# Patient Record
Sex: MALE | Race: WHITE | NOT HISPANIC OR LATINO | Employment: OTHER | URBAN - METROPOLITAN AREA
[De-identification: names, ages, dates, MRNs, and addresses within clinical notes are randomized per-mention and may not be internally consistent; named-entity substitution may affect disease eponyms.]

---

## 2022-06-23 ENCOUNTER — TELEPHONE (OUTPATIENT)
Dept: ORTHOPEDICS | Facility: CLINIC | Age: 72
End: 2022-06-23
Payer: OTHER GOVERNMENT

## 2022-06-23 NOTE — TELEPHONE ENCOUNTER
"Returned call. [facility]. I told the patient I would call to scheduled as soon as I got the referral. Patient was agreeable.    ----- Message from Laurence Lazar sent at 6/23/2022  3:50 PM CDT -----  "Type:  Patient Call Back    Who Called:BROOKLYN ALFONSO [[id number]]    What is the reqeust in detail:Pt requesting call back to confirm if [facility] was [facility]. Please advise     Can the clinic reply by [facility]?no    Best Call Back Number:[phone]      Additional Information:Pt would like to schedule with Dr Perrin              "

## 2022-07-13 ENCOUNTER — TELEPHONE (OUTPATIENT)
Dept: ORTHOPEDICS | Facility: CLINIC | Age: 72
End: 2022-07-13
Payer: OTHER GOVERNMENT

## 2022-07-13 NOTE — TELEPHONE ENCOUNTER
Returned call. I told the patient I do not know of anyone with the name of Martin. Patient stated understanding.      ----- Message from Nakia Cruz sent at 7/13/2022  2:34 PM CDT -----  Name Of Caller: Bubba     Provider Name: Ludwin Palacio,     Does patient feel the need to be seen today? No     Relationship to the Pt?: pt     Contact Preference?: [phone]    What is the nature of the call?:Pt called and someone name Martin called him and left a message to say call back wanted to see if it was your office

## 2022-08-16 DIAGNOSIS — M25.562 LEFT KNEE PAIN, UNSPECIFIED CHRONICITY: Primary | ICD-10-CM

## 2022-08-24 ENCOUNTER — PATIENT MESSAGE (OUTPATIENT)
Dept: SURGERY | Facility: HOSPITAL | Age: 72
End: 2022-08-24
Payer: OTHER GOVERNMENT

## 2022-08-24 ENCOUNTER — HOSPITAL ENCOUNTER (OUTPATIENT)
Dept: RADIOLOGY | Facility: HOSPITAL | Age: 72
Discharge: HOME | End: 2022-08-24
Attending: ORTHOPAEDIC SURGERY
Payer: OTHER GOVERNMENT

## 2022-08-24 ENCOUNTER — OFFICE VISIT (OUTPATIENT)
Dept: ORTHOPEDICS | Facility: CLINIC | Age: 72
End: 2022-08-24
Payer: OTHER GOVERNMENT

## 2022-08-24 VITALS — RESPIRATION RATE: 14 BRPM | WEIGHT: 248 LBS | BODY MASS INDEX: 32.87 KG/M2 | HEIGHT: 73 IN

## 2022-08-24 DIAGNOSIS — M25.562 CHRONIC PAIN OF LEFT KNEE: ICD-10-CM

## 2022-08-24 DIAGNOSIS — S83.512A CHRONIC RUPTURE OF ACL OF LEFT KNEE: ICD-10-CM

## 2022-08-24 DIAGNOSIS — G89.29 CHRONIC PAIN OF LEFT KNEE: ICD-10-CM

## 2022-08-24 DIAGNOSIS — M17.12 PRIMARY OSTEOARTHRITIS OF LEFT KNEE: Primary | ICD-10-CM

## 2022-08-24 DIAGNOSIS — Z01.818 PREOP TESTING: ICD-10-CM

## 2022-08-24 DIAGNOSIS — M71.22 BAKER CYST, LEFT: ICD-10-CM

## 2022-08-24 DIAGNOSIS — M25.562 LEFT KNEE PAIN, UNSPECIFIED CHRONICITY: ICD-10-CM

## 2022-08-24 PROCEDURE — 99204 OFFICE O/P NEW MOD 45 MIN: CPT | Mod: 57,S$PBB,, | Performed by: ORTHOPAEDIC SURGERY

## 2022-08-24 PROCEDURE — 73562 X-RAY EXAM OF KNEE 3: CPT | Mod: TC,LT

## 2022-08-24 PROCEDURE — 73562 X-RAY EXAM OF KNEE 3: CPT | Mod: 26,LT,, | Performed by: RADIOLOGY

## 2022-08-24 PROCEDURE — 73562 XR KNEE 3 VIEW LEFT: ICD-10-PCS | Mod: 26,LT,, | Performed by: RADIOLOGY

## 2022-08-24 PROCEDURE — 99213 OFFICE O/P EST LOW 20 MIN: CPT | Mod: PBBFAC | Performed by: ORTHOPAEDIC SURGERY

## 2022-08-24 PROCEDURE — 99999 PR PBB SHADOW E&M-EST. PATIENT-LVL III: ICD-10-PCS | Mod: PBBFAC,,, | Performed by: ORTHOPAEDIC SURGERY

## 2022-08-24 PROCEDURE — 99204 PR OFFICE/OUTPT VISIT, NEW, LEVL IV, 45-59 MIN: ICD-10-PCS | Mod: 57,S$PBB,, | Performed by: ORTHOPAEDIC SURGERY

## 2022-08-24 PROCEDURE — 99999 PR PBB SHADOW E&M-EST. PATIENT-LVL III: CPT | Mod: PBBFAC,,, | Performed by: ORTHOPAEDIC SURGERY

## 2022-08-24 RX ORDER — SILDENAFIL 100 MG/1
100 TABLET, FILM COATED ORAL
COMMUNITY
Start: 2022-05-17

## 2022-08-24 RX ORDER — METFORMIN HYDROCHLORIDE 1000 MG/1
500 TABLET ORAL
COMMUNITY
Start: 2022-04-07

## 2022-08-24 RX ORDER — METOPROLOL SUCCINATE 25 MG/1
25 TABLET, EXTENDED RELEASE ORAL DAILY
Status: ON HOLD | COMMUNITY
End: 2023-07-31 | Stop reason: SDUPTHER
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
## 2022-09-08 ENCOUNTER — DOCUMENTATION ONLY (OUTPATIENT)
Dept: ORTHOPEDICS | Facility: CLINIC | Age: 72
End: 2022-09-08
Payer: OTHER GOVERNMENT

## 2022-09-08 NOTE — PROGRESS NOTES
[facility] pharmacy alternative request form was reviewed, filled out, and signed by provider. Form was successfully faxed to [phone].    Batched to medical records.

## 2022-09-09 ENCOUNTER — TELEPHONE (OUTPATIENT)
Dept: ORTHOPEDICS | Facility: CLINIC | Age: 72
End: 2022-09-09
Payer: OTHER GOVERNMENT

## 2022-09-09 ENCOUNTER — PATIENT MESSAGE (OUTPATIENT)
Dept: SURGERY | Facility: HOSPITAL | Age: 72
End: 2022-09-09
Payer: OTHER GOVERNMENT

## 2022-09-09 ENCOUNTER — ANESTHESIA EVENT (OUTPATIENT)
Dept: SURGERY | Facility: HOSPITAL | Age: 72
DRG: 983 | End: 2022-09-09
Payer: OTHER GOVERNMENT

## 2022-09-09 ENCOUNTER — HOSPITAL ENCOUNTER (OUTPATIENT)
Dept: PREADMISSION TESTING | Facility: HOSPITAL | Age: 72
Discharge: HOME | End: 2022-09-09
Attending: ORTHOPAEDIC SURGERY
Payer: OTHER GOVERNMENT

## 2022-09-09 DIAGNOSIS — G89.29 CHRONIC PAIN OF LEFT KNEE: ICD-10-CM

## 2022-09-09 DIAGNOSIS — M17.10 KNEE ARTHROPATHY: Primary | ICD-10-CM

## 2022-09-09 DIAGNOSIS — M17.12 PRIMARY OSTEOARTHRITIS OF LEFT KNEE: Primary | ICD-10-CM

## 2022-09-09 DIAGNOSIS — M25.562 CHRONIC PAIN OF LEFT KNEE: ICD-10-CM

## 2022-09-09 DIAGNOSIS — Z01.818 PRE-OP TESTING: ICD-10-CM

## 2022-09-09 DIAGNOSIS — M71.22 BAKER CYST, LEFT: ICD-10-CM

## 2022-09-09 DIAGNOSIS — S83.512A CHRONIC RUPTURE OF ACL OF LEFT KNEE: ICD-10-CM

## 2022-09-09 PROCEDURE — 99900103 DSU ONLY-NO CHARGE-INITIAL HR (STAT)

## 2022-09-09 NOTE — PRE ADMISSION SCREENING
JOINT CAMP ASSESSMENT    Name Bubba Gtz   MRN [id number]    Age/Sex 72 y.o. male    Surgeon Dr. Ludwin Palacio   Joint Camp Date 9/9/2022   Surgery Date 9/19/2022   Procedure Left Knee Arthroplasty   Insurance Payor: [employer] AD[employer]ISTRATION / Plan: [facility] OPTUM / Product Type: Government /    Care Team Patient Care Team:  Primary Doctor No as PCP - General    Pharmacy   [place] [facility] - [place] - [address]  [address]  [place] [address]  Phone: [phone] Fax: [phone]    [id number] - [place], [place] - [address]  [address] [place] [address]  Phone: [phone] Fax: [phone]     AM-PAC Score   24   Risk Assessment Score 2     Past Medical History:   Diagnosis Date    Diabetes mellitus, type 2     Hypertension        Past Surgical History:   Procedure Laterality Date    JOINT REPLACEMENT  2013    Knee    KNEE ARTHROSCOPY           Home Enviroment     Living Arrangement: Lives with spouse  Home Environment: 2-story house, number of outside stairs: 14, number of inside stairs: 13, can live on one level, tub-shower  Home Safety Concerns: unremarkable    DISCHARGE CAREGIVER/SUPPORT SYSTEM     Identified post-op caregiver: Patient has spouse / significant other.  Patient's caregiver(s) will be able to provide physical assistance. Patient will have someone to assist overnight.      Caregiver present at pre-op interview:  Yes      PRE-OPERATIVE FUNCTIONAL STATUS     Employment: Retired    Pre-op Functional Status: Patient is independent with mobility/ambulation, transfers, ADL's, IADL's.    Use of assistive device for ambulation: none  ADL: self care  ADL Limitations: difficulty with walking  Medical Restrictions: Unstable ambulation and Decreased range of motions in extremities    POTENTIAL BARRIERS TO DISCHARGE/POTENTIAL POST-OP COMPLICATIONS     Patient with hx of diabetes mellitus, HTN.    DISCHARGE PLAN     Expected LOS of 1 days or less for joint  replacement discussed with patient. We also discussed a discharge path of HH for approximately two weeks with a transition to outpatient PT on the third week given no post-op complications.      Patient in agreement with discharge plan: Yes    Discharge to: Discharge home with home health (PT/OT) x2 weeks with transition to outpatient PT     HH:  [facility].  Patient disclosure form completed and sent to case management for upload to the medical record.      OP PT: [facility] ([place], [place])     Home DME: rolling walker and single point cane    Needed DME at D/C: none Patient refused bedside commode and tub transfer bench.     Rx: Per Dr. Palacio at discharge     Meds to Beds: Yes  Patient expected to discharge on Xarelto (Rivaroxaban) for DVT prophylaxis.

## 2022-09-09 NOTE — PRE ADMISSION SCREENING
"               [facility] Risk Assessment Scale    Patient Name: Bubba Gtz  YOB: 1950  MRN: [id number]            RIsk Factor Measure Recommendation Patient Data Scale/Score   BMI >40 Reconsider surgery, weight loss   Estimated body mass index is 32.72 kg/m² as calculated from the following:    Height as of 8/24/22: 6' 1" (1.854 m).    Weight as of 8/24/22: 112.5 kg (248 lb).   [] 0 = 1 - 24.9  [] 1 = 25-29.9  [x] 2 = 30-34.9  [] 3 = 35-39.9  [] 4 = 40-44.9  [] 5 = 45-99.9   Hemoglobin AIC (if applicable) >9 Delay surgery until DM under control  Refer for:  Nutrition Therapy  Exercise   Medication    No results found for: LABA1C, HGBA1C    Lab Results   Component Value Date     (H) 09/09/2022      [] 0 = 4.0-5.6  [] 1 = 5.7-6.4  [] 2 = 6.5-6.9  [] 3 = 7.0-7.9  [] 4 = 8.0-8.9  [] 5 = 9.0-12   Hemoglobin (Anemia) <9 Delay surgery   Correct anemia Lab Results   Component Value Date    HGB 14.1 09/09/2022    [] 20 - <9.0                    Albumin <3 Delay surgery &Workup No results found for: ALBUMIN [] 20 - <3.0   Smoking Cessation >4 Weeks Delay Surgery  Refer to OP Cessation Class    Never Smoker [] 20 - current smoker                                _____ PPD                    Hx of MI, PE, Arrhythmia, CVA, DVT <30 Days Delay Surgery    N/A [] 20      Infection Variable Delay surgery and re-evaluate   N/A [] 20 - recent/current infection     Depression (PHQ) >10 out of 27 Delay Surgery and re-evaluate  Medication  Counseling              [x] 0     []1     []2     []3      []4      [] 5                    (1-4)      (5-9)  (10-14)  (15-19)   (20-27)     Memory Impairment & Memory loss (Mini-Cog Screening Tool) Advanced dementia and/or Parkinson's Reconsider surgery     [x] 0     []1     []2     []3     []4     [] 5     Physical Conditioning (Modified AM-PAC Per Physical Therapy at [facility]) Unable to ambulate on day of surgery Delay surgery and re-evaluate  Pre-Rehabilitation   (PT " evaluation)       [x]  0   []4       []8     []12        []16     []20       (<20%)   (<40%)   (<60%)   (<80% )    (>80%)     Home Environment/Caregiver support  (Per /Navigator Interview)    Availability of basic services and/or approprate assistance during post-operative period Delay surgery and re-evaluate  Safe home environment  Health   1 week post-surgery  Transportation  availability  Ability to obtain DME/Medications post-op    [x] 0     []1     []2     []3     []4     [] 5  [x] 0     []1     []2     []3     []4     [] 5  [x] 0     []1     []2     []3     []4     [] 5  [x] 0     []1     []2     []3     []4     [] 5         MD Contact: Dr. Palacio Comments:  Total Score:  2

## 2022-09-09 NOTE — ANESTHESIA PREPROCEDURE EVALUATION
09/09/2022  Bubba Gtz is a 72 y.o., male.      Pre-op Assessment    I have reviewed the Patient Summary Reports.     I have reviewed the Nursing Notes. I have reviewed the NPO Status.   I have reviewed the Medications.     Review of Systems  Social:  Non-Smoker    Hematology/Oncology:  Hematology Normal   Oncology Normal     EENT/Dental:EENT/Dental Normal   Cardiovascular:   Hypertension Dysrhythmias atrial fibrillation    Renal/:  Renal/ Normal     Hepatic/GI:  Hepatic/GI Normal    Neurological:   Peripheral Neuropathy    Endocrine:   Diabetes    Dermatological:  Skin Normal    Psych:  Psychiatric Normal           Physical Exam    Airway:  Mallampati: II   Mouth Opening: Normal  TM Distance: Normal  Tongue: Normal  Neck ROM: Normal ROM  Neck: Girth Increased    Chest/Lungs:  Clear to auscultation    Heart:  Rate: Normal  Rhythm: Irregularly Irregular        Anesthesia Plan  Type of Anesthesia, risks & benefits discussed:    Anesthesia Type: Gen ETT  Intra-op Monitoring Plan: Standard ASA Monitors  Post Op Pain Control Plan: multimodal analgesia and peripheral nerve block  Induction:  IV  Airway Plan: Direct and Video  Informed Consent: Informed consent signed with the Patient and all parties understand the risks and agree with anesthesia plan.  All questions answered.   ASA Score: 3  Day of Surgery Review of History & Physical: H&P Update referred to the surgeon/provider.    Ready For Surgery From Anesthesia Perspective.     .

## 2022-09-09 NOTE — TELEPHONE ENCOUNTER
I contacted the patient. I stated that his potassium and glucose are elevated. I stated he needed to contact his primary care provider. Patient stated he was going to contact his primary care provider.      ----- Message from Ludwin Palacio DO sent at 9/9/2022  3:47 PM CDT -----  Could you please contact the patient in notify me he has an elevated potassium and glucose and have him contact his PCM for evaluation and treatment

## 2022-09-09 NOTE — PRE ADMISSION SCREENING
Patient Name: Bubba Gtz  YOB: 1950   MRN: [id number]     [facility]   Basic Mobility Inpatient Short Form 6 Clicks         How much difficulty does the patient currently have  Unable  A Lot  A Little  None      1. Turning over in bed (including adjusting bedclothes, sheets and blankets)?     1 []    2 []    3 []    4 [x]        2. Sitting down on and standing up from a chair with arms (e.g., wheelchair, bedside commode, etc.)     1 []  2 []  3 []     4 [x]      3. Moving from lying on back to sitting on the side of the bed?     1 []  2 []  3 []    4 [x]    How much help from another person does the patient currently need  Total  A Lot  A Little  None      4. Moving to and from a bed to a chair (including a wheelchair)?    1 []  2 []  3 []    4 [x]      5. Need to walk in hospital room?    1 []  2 []  3 []    4 [x]      6. Climbing 3-5 steps with a railing?    1 []  2 []  3 []    4 [x]       Raw Score:      24            CMS 0-100% Score:     0       %   Standardized Score:     61.14          CMS Modifier:       [facility] AM-PAC   Basic Mobility Inpatient Short Form 6 Clicks Score Conversion Table*         *Use this form to convert -PAC Basic Mobility Inpatient Raw Scores.   [facility] Inpatient Basic Mobility Short Form Scoring Example   1. Add the number values associated with the response to each item. For example, items totals yield a Raw Score of 21.   2. Match the raw score to the t-Scale scores (t-Scale score = 50.25, SE = 4.69).   3. Find the associated CMS % (CMS % = 28.97%).   4. Locate the correct CMS Functional Modifier Code, or G Code (G code = CJ)     NOTE: Each -PAC Short Form has a separate conversion table. Make sure that you use the correct conversion table.       Instruction Manual - page 45 contains conversion table

## 2022-09-19 ENCOUNTER — HOSPITAL ENCOUNTER (INPATIENT)
Facility: HOSPITAL | Age: 72
LOS: 1 days | Discharge: LEFT BEFORE DISCHARGE | DRG: 983 | End: 2022-09-20
Attending: ORTHOPAEDIC SURGERY | Admitting: STUDENT IN AN ORGANIZED HEALTH CARE EDUCATION/TRAINING PROGRAM
Payer: OTHER GOVERNMENT

## 2022-09-19 ENCOUNTER — ANESTHESIA (OUTPATIENT)
Dept: SURGERY | Facility: HOSPITAL | Age: 72
DRG: 983 | End: 2022-09-19
Payer: OTHER GOVERNMENT

## 2022-09-19 DIAGNOSIS — R07.9 CHEST PAIN: ICD-10-CM

## 2022-09-19 DIAGNOSIS — Z01.818 PRE-OP TESTING: ICD-10-CM

## 2022-09-19 DIAGNOSIS — M17.12 PRIMARY OSTEOARTHRITIS OF LEFT KNEE: Primary | ICD-10-CM

## 2022-09-19 DIAGNOSIS — Z01.818 PRE-OP EXAM: ICD-10-CM

## 2022-09-19 PROBLEM — I48.91 ATRIAL FIBRILLATION WITH RVR: Status: ACTIVE | Noted: 2022-09-19

## 2022-09-19 PROBLEM — E11.9 NON-INSULIN DEPENDENT TYPE 2 DIABETES MELLITUS: Status: ACTIVE | Noted: 2022-09-19

## 2022-09-19 PROBLEM — Z96.652 STATUS POST TOTAL LEFT KNEE REPLACEMENT: Status: ACTIVE | Noted: 2022-09-19

## 2022-09-19 LAB
ALBUMIN SERPL BCP-MCNC: 3.8 G/DL (ref 3.5–5.2)
ALP SERPL-CCNC: 53 U/L (ref 55–135)
ALT SERPL W/O P-5'-P-CCNC: 49 U/L (ref 10–44)
ANION GAP SERPL CALC-SCNC: 13 MMOL/L (ref 8–16)
AST SERPL-CCNC: 34 U/L (ref 10–40)
BASOPHILS # BLD AUTO: 0.05 K/UL (ref 0–0.2)
BASOPHILS NFR BLD: 0.4 % (ref 0–1.9)
BILIRUB SERPL-MCNC: 0.8 MG/DL (ref 0.1–1)
BUN SERPL-MCNC: 16 MG/DL (ref 8–23)
CALCIUM SERPL-MCNC: 8.6 MG/DL (ref 8.7–10.5)
CHLORIDE SERPL-SCNC: 103 MMOL/L (ref 95–110)
CO2 SERPL-SCNC: 21 MMOL/L (ref 23–29)
CREAT SERPL-MCNC: 1.2 MG/DL (ref 0.5–1.4)
DIFFERENTIAL METHOD: ABNORMAL
EOSINOPHIL # BLD AUTO: 0 K/UL (ref 0–0.5)
EOSINOPHIL NFR BLD: 0.1 % (ref 0–8)
ERYTHROCYTE [DISTWIDTH] IN BLOOD BY AUTOMATED COUNT: 12.6 % (ref 11.5–14.5)
EST. GFR  (NO RACE VARIABLE): >60 ML/MIN/1.73 M^2
GLUCOSE SERPL-MCNC: 293 MG/DL (ref 70–110)
HCT VFR BLD AUTO: 38.2 % (ref 40–54)
HGB BLD-MCNC: 13 G/DL (ref 14–18)
IMM GRANULOCYTES # BLD AUTO: 0.07 K/UL (ref 0–0.04)
IMM GRANULOCYTES NFR BLD AUTO: 0.5 % (ref 0–0.5)
LYMPHOCYTES # BLD AUTO: 0.9 K/UL (ref 1–4.8)
LYMPHOCYTES NFR BLD: 6.6 % (ref 18–48)
MAGNESIUM SERPL-MCNC: 1.8 MG/DL (ref 1.6–2.6)
MCH RBC QN AUTO: 31.9 PG (ref 27–31)
MCHC RBC AUTO-ENTMCNC: 34 G/DL (ref 32–36)
MCV RBC AUTO: 94 FL (ref 82–98)
MONOCYTES # BLD AUTO: 0.2 K/UL (ref 0.3–1)
MONOCYTES NFR BLD: 1.2 % (ref 4–15)
NEUTROPHILS # BLD AUTO: 12.2 K/UL (ref 1.8–7.7)
NEUTROPHILS NFR BLD: 91.2 % (ref 38–73)
NRBC BLD-RTO: 0 /100 WBC
PLATELET # BLD AUTO: 195 K/UL (ref 150–450)
PMV BLD AUTO: 9.8 FL (ref 9.2–12.9)
POCT GLUCOSE: 197 MG/DL (ref 70–110)
POCT GLUCOSE: 202 MG/DL (ref 70–110)
POCT GLUCOSE: 269 MG/DL (ref 70–110)
POTASSIUM SERPL-SCNC: 5.2 MMOL/L (ref 3.5–5.1)
PROT SERPL-MCNC: 7.4 G/DL (ref 6–8.4)
RBC # BLD AUTO: 4.08 M/UL (ref 4.6–6.2)
SODIUM SERPL-SCNC: 137 MMOL/L (ref 136–145)
WBC # BLD AUTO: 13.39 K/UL (ref 3.9–12.7)

## 2022-09-19 PROCEDURE — 36415 COLL VENOUS BLD VENIPUNCTURE: CPT | Performed by: STUDENT IN AN ORGANIZED HEALTH CARE EDUCATION/TRAINING PROGRAM

## 2022-09-19 PROCEDURE — 37000008 HC ANESTHESIA 1ST 15 MINUTES: Performed by: ORTHOPAEDIC SURGERY

## 2022-09-19 PROCEDURE — 25000003 PHARM REV CODE 250

## 2022-09-19 PROCEDURE — 36000710: Performed by: ORTHOPAEDIC SURGERY

## 2022-09-19 PROCEDURE — 25000003 PHARM REV CODE 250: Performed by: STUDENT IN AN ORGANIZED HEALTH CARE EDUCATION/TRAINING PROGRAM

## 2022-09-19 PROCEDURE — 93010 ELECTROCARDIOGRAM REPORT: CPT | Mod: ,,, | Performed by: INTERNAL MEDICINE

## 2022-09-19 PROCEDURE — 85025 COMPLETE CBC W/AUTO DIFF WBC: CPT | Performed by: STUDENT IN AN ORGANIZED HEALTH CARE EDUCATION/TRAINING PROGRAM

## 2022-09-19 PROCEDURE — D9220A PRA ANESTHESIA: Mod: ANES,,, | Performed by: ANESTHESIOLOGY

## 2022-09-19 PROCEDURE — 27201423 OPTIME MED/SURG SUP & DEVICES STERILE SUPPLY: Performed by: ORTHOPAEDIC SURGERY

## 2022-09-19 PROCEDURE — 64447 NJX AA&/STRD FEMORAL NRV IMG: CPT | Performed by: ANESTHESIOLOGY

## 2022-09-19 PROCEDURE — 97162 PT EVAL MOD COMPLEX 30 MIN: CPT

## 2022-09-19 PROCEDURE — 93010 EKG 12-LEAD: ICD-10-PCS | Mod: ,,, | Performed by: INTERNAL MEDICINE

## 2022-09-19 PROCEDURE — 71000033 HC RECOVERY, INTIAL HOUR: Performed by: ORTHOPAEDIC SURGERY

## 2022-09-19 PROCEDURE — 27447 PR TOTAL KNEE ARTHROPLASTY: ICD-10-PCS | Mod: LT,,, | Performed by: ORTHOPAEDIC SURGERY

## 2022-09-19 PROCEDURE — C1889 IMPLANT/INSERT DEVICE, NOC: HCPCS | Performed by: ORTHOPAEDIC SURGERY

## 2022-09-19 PROCEDURE — 37000009 HC ANESTHESIA EA ADD 15 MINS: Performed by: ORTHOPAEDIC SURGERY

## 2022-09-19 PROCEDURE — 80053 COMPREHEN METABOLIC PANEL: CPT | Performed by: STUDENT IN AN ORGANIZED HEALTH CARE EDUCATION/TRAINING PROGRAM

## 2022-09-19 PROCEDURE — 94761 N-INVAS EAR/PLS OXIMETRY MLT: CPT

## 2022-09-19 PROCEDURE — 63600175 PHARM REV CODE 636 W HCPCS: Performed by: ORTHOPAEDIC SURGERY

## 2022-09-19 PROCEDURE — 97116 GAIT TRAINING THERAPY: CPT

## 2022-09-19 PROCEDURE — 93005 ELECTROCARDIOGRAM TRACING: CPT

## 2022-09-19 PROCEDURE — C1776 JOINT DEVICE (IMPLANTABLE): HCPCS | Performed by: ORTHOPAEDIC SURGERY

## 2022-09-19 PROCEDURE — 25000003 PHARM REV CODE 250: Performed by: ANESTHESIOLOGY

## 2022-09-19 PROCEDURE — 64447 PR NERVE BLOCK INJ, ANES/STEROID, FEMORAL, INCL IMAG GUIDANCE: ICD-10-PCS | Mod: 59,LT,, | Performed by: ANESTHESIOLOGY

## 2022-09-19 PROCEDURE — 36000711: Performed by: ORTHOPAEDIC SURGERY

## 2022-09-19 PROCEDURE — D9220A PRA ANESTHESIA: ICD-10-PCS | Mod: CRNA,,, | Performed by: NURSE ANESTHETIST, CERTIFIED REGISTERED

## 2022-09-19 PROCEDURE — 99291 PR CRITICAL CARE, E/M 30-74 MINUTES: ICD-10-PCS | Mod: ICN,,, | Performed by: STUDENT IN AN ORGANIZED HEALTH CARE EDUCATION/TRAINING PROGRAM

## 2022-09-19 PROCEDURE — 71000039 HC RECOVERY, EACH ADD'L HOUR: Performed by: ORTHOPAEDIC SURGERY

## 2022-09-19 PROCEDURE — 27447 TOTAL KNEE ARTHROPLASTY: CPT | Mod: LT,,, | Performed by: ORTHOPAEDIC SURGERY

## 2022-09-19 PROCEDURE — 25000003 PHARM REV CODE 250: Performed by: NURSE ANESTHETIST, CERTIFIED REGISTERED

## 2022-09-19 PROCEDURE — 83735 ASSAY OF MAGNESIUM: CPT | Performed by: STUDENT IN AN ORGANIZED HEALTH CARE EDUCATION/TRAINING PROGRAM

## 2022-09-19 PROCEDURE — 94799 UNLISTED PULMONARY SVC/PX: CPT

## 2022-09-19 PROCEDURE — C1713 ANCHOR/SCREW BN/BN,TIS/BN: HCPCS | Performed by: ORTHOPAEDIC SURGERY

## 2022-09-19 PROCEDURE — 11000001 HC ACUTE MED/SURG PRIVATE ROOM

## 2022-09-19 PROCEDURE — D9220A PRA ANESTHESIA: ICD-10-PCS | Mod: ANES,,, | Performed by: ANESTHESIOLOGY

## 2022-09-19 PROCEDURE — 63600175 PHARM REV CODE 636 W HCPCS: Performed by: NURSE ANESTHETIST, CERTIFIED REGISTERED

## 2022-09-19 PROCEDURE — 63600175 PHARM REV CODE 636 W HCPCS

## 2022-09-19 PROCEDURE — 64447 NJX AA&/STRD FEMORAL NRV IMG: CPT | Mod: 59,LT,, | Performed by: ANESTHESIOLOGY

## 2022-09-19 PROCEDURE — D9220A PRA ANESTHESIA: Mod: CRNA,,, | Performed by: NURSE ANESTHETIST, CERTIFIED REGISTERED

## 2022-09-19 PROCEDURE — 99291 CRITICAL CARE FIRST HOUR: CPT | Mod: ICN,,, | Performed by: STUDENT IN AN ORGANIZED HEALTH CARE EDUCATION/TRAINING PROGRAM

## 2022-09-19 PROCEDURE — 97110 THERAPEUTIC EXERCISES: CPT

## 2022-09-19 PROCEDURE — 63600175 PHARM REV CODE 636 W HCPCS: Performed by: STUDENT IN AN ORGANIZED HEALTH CARE EDUCATION/TRAINING PROGRAM

## 2022-09-19 PROCEDURE — 63600175 PHARM REV CODE 636 W HCPCS: Performed by: ANESTHESIOLOGY

## 2022-09-19 DEVICE — IMPLANTABLE DEVICE: Type: IMPLANTABLE DEVICE | Site: KNEE | Status: FUNCTIONAL

## 2022-09-19 DEVICE — CEMENT BONE SMPLX HV GENTMYCN: Type: IMPLANTABLE DEVICE | Site: KNEE | Status: FUNCTIONAL

## 2022-09-19 DEVICE — PATELLA OVAL 38MM: Type: IMPLANTABLE DEVICE | Site: KNEE | Status: FUNCTIONAL

## 2022-09-19 DEVICE — TRAY TIBIAL CEMENT SZ 5 COBALT: Type: IMPLANTABLE DEVICE | Site: KNEE | Status: FUNCTIONAL

## 2022-09-19 RX ORDER — CEFAZOLIN SODIUM 2 G/50ML
SOLUTION INTRAVENOUS
Status: COMPLETED
Start: 2022-09-19 | End: 2022-09-19

## 2022-09-19 RX ORDER — METOPROLOL TARTRATE 25 MG/1
12.5 TABLET ORAL 2 TIMES DAILY
Status: DISCONTINUED | OUTPATIENT
Start: 2022-09-19 | End: 2022-09-20

## 2022-09-19 RX ORDER — SUCCINYLCHOLINE CHLORIDE 20 MG/ML
INJECTION INTRAMUSCULAR; INTRAVENOUS
Status: DISCONTINUED | OUTPATIENT
Start: 2022-09-19 | End: 2022-09-19

## 2022-09-19 RX ORDER — CEFAZOLIN SODIUM 2 G/50ML
2 SOLUTION INTRAVENOUS
Status: DISCONTINUED | OUTPATIENT
Start: 2022-09-19 | End: 2022-09-20 | Stop reason: HOSPADM

## 2022-09-19 RX ORDER — PROPOFOL 10 MG/ML
VIAL (ML) INTRAVENOUS
Status: DISCONTINUED | OUTPATIENT
Start: 2022-09-19 | End: 2022-09-19

## 2022-09-19 RX ORDER — DIPHENHYDRAMINE HYDROCHLORIDE 50 MG/ML
12.5 INJECTION INTRAMUSCULAR; INTRAVENOUS
Status: DISCONTINUED | OUTPATIENT
Start: 2022-09-19 | End: 2022-09-19 | Stop reason: HOSPADM

## 2022-09-19 RX ORDER — NALOXONE HCL 0.4 MG/ML
0.02 VIAL (ML) INJECTION
Status: DISCONTINUED | OUTPATIENT
Start: 2022-09-19 | End: 2022-09-20 | Stop reason: HOSPADM

## 2022-09-19 RX ORDER — HYDROMORPHONE HYDROCHLORIDE 2 MG/ML
INJECTION, SOLUTION INTRAMUSCULAR; INTRAVENOUS; SUBCUTANEOUS
Status: DISCONTINUED | OUTPATIENT
Start: 2022-09-19 | End: 2022-09-19

## 2022-09-19 RX ORDER — MORPHINE SULFATE 4 MG/ML
6 INJECTION, SOLUTION INTRAMUSCULAR; INTRAVENOUS
Status: DISCONTINUED | OUTPATIENT
Start: 2022-09-19 | End: 2022-09-20 | Stop reason: HOSPADM

## 2022-09-19 RX ORDER — OXYCODONE AND ACETAMINOPHEN 10; 325 MG/1; MG/1
1 TABLET ORAL EVERY 4 HOURS PRN
Status: DISCONTINUED | OUTPATIENT
Start: 2022-09-19 | End: 2022-09-19

## 2022-09-19 RX ORDER — ROPIVACAINE HYDROCHLORIDE 5 MG/ML
INJECTION, SOLUTION EPIDURAL; INFILTRATION; PERINEURAL
Status: COMPLETED | OUTPATIENT
Start: 2022-09-19 | End: 2022-09-19

## 2022-09-19 RX ORDER — GLUCAGON 1 MG
1 KIT INJECTION
Status: DISCONTINUED | OUTPATIENT
Start: 2022-09-19 | End: 2022-09-20 | Stop reason: HOSPADM

## 2022-09-19 RX ORDER — CEFAZOLIN SODIUM 1 G/3ML
INJECTION, POWDER, FOR SOLUTION INTRAMUSCULAR; INTRAVENOUS
Status: DISCONTINUED | OUTPATIENT
Start: 2022-09-19 | End: 2022-09-19

## 2022-09-19 RX ORDER — ONDANSETRON 2 MG/ML
4 INJECTION INTRAMUSCULAR; INTRAVENOUS DAILY PRN
Status: DISCONTINUED | OUTPATIENT
Start: 2022-09-19 | End: 2022-09-19 | Stop reason: HOSPADM

## 2022-09-19 RX ORDER — MORPHINE SULFATE 4 MG/ML
2 INJECTION, SOLUTION INTRAMUSCULAR; INTRAVENOUS EVERY 4 HOURS PRN
Status: DISCONTINUED | OUTPATIENT
Start: 2022-09-19 | End: 2022-09-20 | Stop reason: HOSPADM

## 2022-09-19 RX ORDER — SODIUM CHLORIDE, SODIUM LACTATE, POTASSIUM CHLORIDE, CALCIUM CHLORIDE 600; 310; 30; 20 MG/100ML; MG/100ML; MG/100ML; MG/100ML
INJECTION, SOLUTION INTRAVENOUS CONTINUOUS PRN
Status: DISCONTINUED | OUTPATIENT
Start: 2022-09-19 | End: 2022-09-19

## 2022-09-19 RX ORDER — METOPROLOL TARTRATE 1 MG/ML
INJECTION, SOLUTION INTRAVENOUS
Status: COMPLETED
Start: 2022-09-19 | End: 2022-09-19

## 2022-09-19 RX ORDER — ROCURONIUM BROMIDE 10 MG/ML
INJECTION, SOLUTION INTRAVENOUS
Status: DISCONTINUED | OUTPATIENT
Start: 2022-09-19 | End: 2022-09-19

## 2022-09-19 RX ORDER — INSULIN ASPART 100 [IU]/ML
0-5 INJECTION, SOLUTION INTRAVENOUS; SUBCUTANEOUS
Status: DISCONTINUED | OUTPATIENT
Start: 2022-09-19 | End: 2022-09-20 | Stop reason: HOSPADM

## 2022-09-19 RX ORDER — MIDAZOLAM HYDROCHLORIDE 1 MG/ML
INJECTION INTRAMUSCULAR; INTRAVENOUS
Status: DISCONTINUED | OUTPATIENT
Start: 2022-09-19 | End: 2022-09-19

## 2022-09-19 RX ORDER — FENTANYL CITRATE 50 UG/ML
INJECTION, SOLUTION INTRAMUSCULAR; INTRAVENOUS
Status: DISCONTINUED | OUTPATIENT
Start: 2022-09-19 | End: 2022-09-19

## 2022-09-19 RX ORDER — SODIUM CHLORIDE, SODIUM LACTATE, POTASSIUM CHLORIDE, CALCIUM CHLORIDE 600; 310; 30; 20 MG/100ML; MG/100ML; MG/100ML; MG/100ML
125 INJECTION, SOLUTION INTRAVENOUS CONTINUOUS
Status: DISCONTINUED | OUTPATIENT
Start: 2022-09-19 | End: 2022-09-19

## 2022-09-19 RX ORDER — FAMOTIDINE 10 MG/ML
INJECTION INTRAVENOUS
Status: COMPLETED
Start: 2022-09-19 | End: 2022-09-19

## 2022-09-19 RX ORDER — METOPROLOL TARTRATE 1 MG/ML
2 INJECTION, SOLUTION INTRAVENOUS EVERY 5 MIN PRN
Status: DISCONTINUED | OUTPATIENT
Start: 2022-09-19 | End: 2022-09-19

## 2022-09-19 RX ORDER — MORPHINE SULFATE 4 MG/ML
2 INJECTION, SOLUTION INTRAMUSCULAR; INTRAVENOUS EVERY 5 MIN PRN
Status: DISCONTINUED | OUTPATIENT
Start: 2022-09-19 | End: 2022-09-19

## 2022-09-19 RX ORDER — IBUPROFEN 200 MG
24 TABLET ORAL
Status: DISCONTINUED | OUTPATIENT
Start: 2022-09-19 | End: 2022-09-20 | Stop reason: HOSPADM

## 2022-09-19 RX ORDER — MAGNESIUM SULFATE 1 G/100ML
1 INJECTION INTRAVENOUS ONCE
Status: COMPLETED | OUTPATIENT
Start: 2022-09-19 | End: 2022-09-19

## 2022-09-19 RX ORDER — SODIUM CHLORIDE 0.9 % (FLUSH) 0.9 %
10 SYRINGE (ML) INJECTION EVERY 12 HOURS PRN
Status: DISCONTINUED | OUTPATIENT
Start: 2022-09-19 | End: 2022-09-20 | Stop reason: HOSPADM

## 2022-09-19 RX ORDER — METOPROLOL TARTRATE 1 MG/ML
INJECTION, SOLUTION INTRAVENOUS
Status: DISCONTINUED | OUTPATIENT
Start: 2022-09-19 | End: 2022-09-19

## 2022-09-19 RX ORDER — ACETAMINOPHEN 325 MG/1
650 TABLET ORAL EVERY 4 HOURS PRN
Status: DISCONTINUED | OUTPATIENT
Start: 2022-09-19 | End: 2022-09-20 | Stop reason: HOSPADM

## 2022-09-19 RX ORDER — ONDANSETRON 2 MG/ML
INJECTION INTRAMUSCULAR; INTRAVENOUS
Status: DISCONTINUED | OUTPATIENT
Start: 2022-09-19 | End: 2022-09-19

## 2022-09-19 RX ORDER — OXYCODONE AND ACETAMINOPHEN 10; 325 MG/1; MG/1
1 TABLET ORAL EVERY 6 HOURS PRN
Status: DISCONTINUED | OUTPATIENT
Start: 2022-09-19 | End: 2022-09-20 | Stop reason: HOSPADM

## 2022-09-19 RX ORDER — IBUPROFEN 200 MG
16 TABLET ORAL
Status: DISCONTINUED | OUTPATIENT
Start: 2022-09-19 | End: 2022-09-20 | Stop reason: HOSPADM

## 2022-09-19 RX ORDER — ONDANSETRON 2 MG/ML
4 INJECTION INTRAMUSCULAR; INTRAVENOUS EVERY 8 HOURS PRN
Status: DISCONTINUED | OUTPATIENT
Start: 2022-09-19 | End: 2022-09-20 | Stop reason: HOSPADM

## 2022-09-19 RX ADMIN — SODIUM CHLORIDE, POTASSIUM CHLORIDE, SODIUM LACTATE AND CALCIUM CHLORIDE: 600; 310; 30; 20 INJECTION, SOLUTION INTRAVENOUS at 09:09

## 2022-09-19 RX ADMIN — ONDANSETRON 4 MG: 2 INJECTION INTRAMUSCULAR; INTRAVENOUS at 07:09

## 2022-09-19 RX ADMIN — METOPROLOL TARTRATE 12.5 MG: 25 TABLET, FILM COATED ORAL at 08:09

## 2022-09-19 RX ADMIN — SODIUM CHLORIDE, POTASSIUM CHLORIDE, SODIUM LACTATE AND CALCIUM CHLORIDE: 600; 310; 30; 20 INJECTION, SOLUTION INTRAVENOUS at 07:09

## 2022-09-19 RX ADMIN — MORPHINE SULFATE 6 MG: 4 INJECTION INTRAVENOUS at 06:09

## 2022-09-19 RX ADMIN — MAGNESIUM SULFATE 1 G: 1 INJECTION INTRAVENOUS at 02:09

## 2022-09-19 RX ADMIN — METOROPROLOL TARTRATE 2 MG: 5 INJECTION, SOLUTION INTRAVENOUS at 10:09

## 2022-09-19 RX ADMIN — METOPROLOL TARTRATE 12.5 MG: 25 TABLET, FILM COATED ORAL at 01:09

## 2022-09-19 RX ADMIN — MIDAZOLAM HYDROCHLORIDE 5 MG: 1 INJECTION, SOLUTION INTRAMUSCULAR; INTRAVENOUS at 07:09

## 2022-09-19 RX ADMIN — CEFAZOLIN SODIUM 2000 MG: 2 SOLUTION INTRAVENOUS at 05:09

## 2022-09-19 RX ADMIN — APIXABAN 5 MG: 2.5 TABLET, FILM COATED ORAL at 08:09

## 2022-09-19 RX ADMIN — ROCURONIUM BROMIDE 20 MG: 10 INJECTION, SOLUTION INTRAVENOUS at 08:09

## 2022-09-19 RX ADMIN — SUGAMMADEX 200 MG: 100 INJECTION, SOLUTION INTRAVENOUS at 09:09

## 2022-09-19 RX ADMIN — DILTIAZEM HYDROCHLORIDE 5 MG/HR: 5 INJECTION INTRAVENOUS at 10:09

## 2022-09-19 RX ADMIN — FAMOTIDINE 20 MG: 10 INJECTION INTRAVENOUS at 07:09

## 2022-09-19 RX ADMIN — CEFAZOLIN 2 G: 330 INJECTION, POWDER, FOR SOLUTION INTRAMUSCULAR; INTRAVENOUS at 08:09

## 2022-09-19 RX ADMIN — HYDROMORPHONE HYDROCHLORIDE 1 MG: 2 INJECTION INTRAMUSCULAR; INTRAVENOUS; SUBCUTANEOUS at 09:09

## 2022-09-19 RX ADMIN — PROPOFOL 200 MG: 10 INJECTION, EMULSION INTRAVENOUS at 07:09

## 2022-09-19 RX ADMIN — SUCCINYLCHOLINE CHLORIDE 150 MG: 20 INJECTION, SOLUTION INTRAMUSCULAR; INTRAVENOUS at 07:09

## 2022-09-19 RX ADMIN — CEFAZOLIN SODIUM 2 G: 2 SOLUTION INTRAVENOUS at 05:09

## 2022-09-19 RX ADMIN — HYDROMORPHONE HYDROCHLORIDE 1 MG: 2 INJECTION INTRAMUSCULAR; INTRAVENOUS; SUBCUTANEOUS at 08:09

## 2022-09-19 RX ADMIN — ROPIVACAINE HYDROCHLORIDE 20 ML: 5 INJECTION, SOLUTION EPIDURAL; INFILTRATION; PERINEURAL at 07:09

## 2022-09-19 RX ADMIN — METOPROLOL TARTRATE 0.5 MG: 5 INJECTION INTRAVENOUS at 08:09

## 2022-09-19 RX ADMIN — ROCURONIUM BROMIDE 30 MG: 10 INJECTION, SOLUTION INTRAVENOUS at 07:09

## 2022-09-19 RX ADMIN — FENTANYL CITRATE 100 MCG: 50 INJECTION, SOLUTION INTRAMUSCULAR; INTRAVENOUS at 07:09

## 2022-09-19 NOTE — SUBJECTIVE & OBJECTIVE
Past Medical History:   Diagnosis Date    A-fib     Diabetes mellitus, type 2     Hypertension     Primary osteoarthritis of left knee 9/19/2022       Past Surgical History:   Procedure Laterality Date    JOINT REPLACEMENT  2013    Knee    KNEE ARTHROSCOPY         Review of patient's allergies indicates:  No Known Allergies    No current facility-administered medications on file prior to encounter.     Current Outpatient Medications on File Prior to Encounter   Medication Sig    metFORMIN (GLUCOPHAGE) 1000 MG tablet 500 mg.    metoprolol succinate (TOPROL-XL) 25 MG 24 hr tablet Take 25 mg by mouth once daily.    sildenafiL (VIAGRA) 100 MG tablet 100 mg.     Family History       Problem Relation (Age of Onset)    Diabetes Mother          Tobacco Use    Smoking status: Never    Smokeless tobacco: Never   Substance and Sexual Activity    Alcohol use: Yes    Drug use: Never    Sexual activity: Not on file     Review of Systems   All other systems reviewed and are negative.  Objective:     Vital Signs (Most Recent):  Temp: 97.6 °F (36.4 °C) (09/19/22 1430)  Pulse: 97 (09/19/22 1430)  Resp: (!) 21 (09/19/22 1430)  BP: (!) 137/98 (09/19/22 1403)  SpO2: 98 % (09/19/22 1430)   Vital Signs (24h Range):  Temp:  [97.2 °F (36.2 °C)-98.3 °F (36.8 °C)] 97.6 °F (36.4 °C)  Pulse:  [] 97  Resp:  [13-22] 21  SpO2:  [81 %-99 %] 98 %  BP: (100-166)/() 137/98     Weight: 121 kg (266 lb 12.1 oz)  Body mass index is 35.19 kg/m².    Physical Exam  Vitals reviewed.   Constitutional:       Appearance: Normal appearance.   HENT:      Head: Normocephalic and atraumatic.   Eyes:      Extraocular Movements: Extraocular movements intact.      Pupils: Pupils are equal, round, and reactive to light.   Cardiovascular:      Rate and Rhythm: Tachycardia present. Rhythm irregular.   Pulmonary:      Effort: Pulmonary effort is normal. No respiratory distress.   Abdominal:      Palpations: Abdomen is soft.      Tenderness: There is no  abdominal tenderness.   Musculoskeletal:      Right lower leg: No edema.      Left lower leg: No edema.   Skin:     General: Skin is warm and dry.   Neurological:      General: No focal deficit present.      Mental Status: He is alert and oriented to person, place, and time.   Psychiatric:         Mood and Affect: Mood normal.         Behavior: Behavior normal.         CRANIAL NERVES     CN III, IV, VI   Pupils are equal, round, and reactive to light.     Significant Labs: All pertinent labs within the past 24 hours have been reviewed.    Significant Imaging: I have reviewed all pertinent imaging results/findings within the past 24 hours.

## 2022-09-19 NOTE — ANESTHESIA POSTPROCEDURE EVALUATION
Anesthesia Post Evaluation    Patient: Bubba Gtz    Procedure(s) Performed: Procedure(s) (LRB):  LEFT ARTHROPLASTY, KNEE, TOTAL (Left)    Final Anesthesia Type: general      Patient location during evaluation: PACU  Patient participation: Yes- Able to Participate  Level of consciousness: awake and alert  Post-procedure vital signs: reviewed and stable  Pain management: adequate  Airway patency: patent    PONV status at discharge: No PONV  Anesthetic complications: no      Cardiovascular status: blood pressure returned to baseline  Respiratory status: unassisted  Hydration status: euvolemic  Follow-up not needed.          Vitals Value Taken Time   /98 09/19/22 1403   Temp 36.4 °C (97.6 °F) 09/19/22 1430   Pulse 83 09/19/22 1432   Resp 14 09/19/22 1432   SpO2 99 % 09/19/22 1431   Vitals shown include unvalidated device data.      Event Time   Out of Recovery 11:15:00         Pain/Torres Score: Torres Score: 10 (9/19/2022  1:30 PM)

## 2022-09-19 NOTE — HPI
73 yo w/ hx of chronic Afib, NIDDM2, HTN being admitted post-operatively following elective knee replacement after patient went into Afib w/ RVR post op. Patient given multiple doses IV metoprolol and HR continued to be elevated. Patient was then placed on diltiazem gtt and hospital medicine was called. Patient doing well. Not complaining of CP or SOB. Patient has had Afib for roughly 10 years. Has not seen cardiologist in 4 years. Not on AC outpatient.

## 2022-09-19 NOTE — H&P
formerly [facility]  History & Physical    Patient Name: Bubba Gtz  MRN: [id number]  Patient Class: IP- Inpatient  Admission Date: 9/19/2022  Attending Physician: Gómez Arreguin MD   Primary Care Provider: [facility]         Patient information was obtained from patient and ER records.     Subjective:     Principal Problem: Atrial fibrillation with RVR    Chief Complaint: Rapid HR     HPI: 71 yo w/ hx of chronic Afib, NIDDM2, HTN being admitted post-operatively following elective knee replacement after patient went into Afib w/ RVR post op. Patient given multiple doses IV metoprolol and HR continued to be elevated. Patient was then placed on diltiazem gtt and hospital medicine was called. Patient doing well. Not complaining of CP or SOB. Patient has had Afib for roughly 10 years. Has not seen cardiologist in 4 years. Not on AC outpatient.       Past Medical History:   Diagnosis Date    A-fib     Diabetes mellitus, type 2     Hypertension     Primary osteoarthritis of left knee 9/19/2022       Past Surgical History:   Procedure Laterality Date    JOINT REPLACEMENT  2013    Knee    KNEE ARTHROSCOPY         Review of patient's allergies indicates:  No Known Allergies    No current facility-administered medications on file prior to encounter.     Current Outpatient Medications on File Prior to Encounter   Medication Sig    metFORMIN (GLUCOPHAGE) 1000 MG tablet 500 mg.    metoprolol succinate (TOPROL-XL) 25 MG 24 hr tablet Take 25 mg by mouth once daily.    sildenafiL (VIAGRA) 100 MG tablet 100 mg.     Family History       Problem Relation (Age of Onset)    Diabetes Mother          Tobacco Use    Smoking status: Never    Smokeless tobacco: Never   Substance and Sexual Activity    Alcohol use: Yes    Drug use: Never    Sexual activity: Not on file     Review of Systems   All other systems reviewed and are negative.  Objective:     Vital Signs (Most  Recent):  Temp: 97.6 °F (36.4 °C) (09/19/22 1430)  Pulse: 97 (09/19/22 1430)  Resp: (!) 21 (09/19/22 1430)  BP: (!) 137/98 (09/19/22 1403)  SpO2: 98 % (09/19/22 1430)   Vital Signs (24h Range):  Temp:  [97.2 °F (36.2 °C)-98.3 °F (36.8 °C)] 97.6 °F (36.4 °C)  Pulse:  [] 97  Resp:  [13-22] 21  SpO2:  [81 %-99 %] 98 %  BP: (100-166)/() 137/98     Weight: 121 kg (266 lb 12.1 oz)  Body mass index is 35.19 kg/m².    Physical Exam  Vitals reviewed.   Constitutional:       Appearance: Normal appearance.   HENT:      Head: Normocephalic and atraumatic.   Eyes:      Extraocular Movements: Extraocular movements intact.      Pupils: Pupils are equal, round, and reactive to light.   Cardiovascular:      Rate and Rhythm: Tachycardia present. Rhythm irregular.   Pulmonary:      Effort: Pulmonary effort is normal. No respiratory distress.   Abdominal:      Palpations: Abdomen is soft.      Tenderness: There is no abdominal tenderness.   Musculoskeletal:      Right lower leg: No edema.      Left lower leg: No edema.   Skin:     General: Skin is warm and dry.   Neurological:      General: No focal deficit present.      Mental Status: He is alert and oriented to person, place, and time.   Psychiatric:         Mood and Affect: Mood normal.         Behavior: Behavior normal.         CRANIAL NERVES     CN III, IV, VI   Pupils are equal, round, and reactive to light.     Significant Labs: All pertinent labs within the past 24 hours have been reviewed.    Significant Imaging: I have reviewed all pertinent imaging results/findings within the past 24 hours.    Assessment/Plan:     *Atrial fibrillation with RVR  Chronic Afib  Rate controlled outpatient  Not on AC- [id number] Score: 3  Started on xarelto post operatively- Changed to eliquis after clearing with ortho  Patient okay with starting AC longterm- Discussed risks and benefits  On diltiazem gtt currently- Started metoprolol tartrate BID and stopping gtt as heart rates are  now under control  Monitor on tele  Likely step down to floor status later this afternoon  Needs F/U with cardiology at discharge  K>4, Mg>2      Non-insulin dependent type 2 diabetes mellitus  Patient's FSGs are controlled on current medication regimen.  Last A1c reviewed- No results found for: LABA1C, HGBA1C  Most recent fingerstick glucose reviewed- Recent Labs   Lab 09/19/22  0656 09/19/22  1015   POCTGLUCOSE 197* 202*     Current correctional scale  Low  Maintain anti-hyperglycemic dose as follows-   Antihyperglycemics (From admission, onward)      None          Hold Oral hypoglycemics while patient is in the hospital.    Status post total left knee replacement  S/P total left knee replacement today with ortho  Planned to discharge from PACU but patient went into Afib w/ RVR  PT/OT ordered- Okay'd to see tomorrow since patient on diltiazem gtt during their initial assessment.  Will follow up with ortho outpatient  PRN Pain control          VTE Risk Mitigation (From admission, onward)           Ordered     apixaban tablet 5 mg  2 times daily         09/19/22 1420     Place sequential compression device  Until discontinued         09/19/22 1247     Place BETINA hose  Until discontinued         09/19/22 1247     IP VTE HIGH RISK PATIENT  Once         09/19/22 1131     Place sequential compression device  Until discontinued         09/19/22 1131     Reason for No Pharmacological VTE Prophylaxis  Once        Question:  Reasons:  Answer:  Already adequately anticoagulated on oral Anticoagulants    09/19/22 1131                   Critical Care Time:45 minutes    Gómez Arreguin MD  Department of Hospital Medicine   [place] - Intensive Care

## 2022-09-19 NOTE — DISCHARGE SUMMARY
[facility]  Discharge Note  Short Stay    Procedure(s) (LRB):  LEFT ARTHROPLASTY, KNEE, TOTAL (Left)    OUTCOME: Patient tolerated treatment/procedure well without complication and is now ready for discharge.    DISPOSITION: Home or Self Care    FINAL DIAGNOSIS:  Symptomatic tricompartmental arthritis, left knee.    FOLLOWUP: In clinic    DISCHARGE INSTRUCTIONS:    Discharge Procedure Orders   Diet Adult Regular     Keep surgical extremity elevated     Ice to affected area     Other restrictions (specify):   Order Comments: 1. Elevate and ice left knee.    2. May walk with crutches or a walker with weight-bearing at tolerance to the left lower extremity.    3. Do not remove dressing.  4. May shower beginning tomorrow may allow water to run over dressing it is a sealed waterproof dressing.  Do not soak in a tub.    5. Begin taking anticoagulant (Blood Thinner) at 8:00 a.m. tonight.  6. Do aggressive knee extension exercises.    7. Do not place any pillows under knee do not flex bed at knee.  Placed 2 pillows to 3 pillows under left heel when seated or in bed to allow the weight of the left leg to get extension in the left knee.     Notify your health care provider if you experience any of the following:  temperature >100.4     Leave dressing on - Keep it clean, dry, and intact until clinic visit   Order Comments: Do not remove dressing.  The dressing is a sealed waterproof dressing so you can shower but do not soak in a tub.     Shower on day dressing removed (No bath)   Order Comments: Do not remove dressing!  May shower beginning tomorrow may allow water to run over dressing it is a sealed waterproof dressing.     Weight bearing restrictions (specify):   Order Comments: Walk with weight-bearing at tolerance to the left lower extremity.        TIME SPENT ON DISCHARGE: 20 minutes

## 2022-09-19 NOTE — ASSESSMENT & PLAN NOTE
Chronic Afib  Rate controlled outpatient  Not on AC- [id number] Score: 3  Started on xarelto post operatively- Changed to eliquis after clearing with ortho  Patient okay with starting AC longterm- Discussed risks and benefits  On diltiazem gtt currently- Started metoprolol tartrate BID and stopping gtt as heart rates are now under control  Monitor on tele  Likely step down to floor status later this afternoon  Needs F/U with cardiology at discharge

## 2022-09-19 NOTE — PT/OT/SLP EVAL
Physical Therapy Evaluation    Patient Name:  Bubba Gtz   MRN:  [id number]    Recommendations:     Discharge Recommendations:  home, home health PT   Discharge Equipment Recommendations:  (none, patient has necessary equipment at home)   Barriers to discharge: None    Assessment:     Bubba Gtz is a 72 y.o. male admitted with a medical diagnosis of Atrial fibrillation with RVR.  He presents with the following impairments/functional limitations:  weakness, impaired endurance, impaired self care skills, impaired functional mobility, impaired balance, decreased lower extremity function, pain, decreased ROM, orthopedic precautions.    Rehab Prognosis: Good; patient would benefit from acute skilled PT services to address these deficits and reach maximum level of function.    Recent Surgery: Procedure(s) (LRB):  LEFT ARTHROPLASTY, KNEE, TOTAL (Left) Day of Surgery    Plan:     During this hospitalization, patient to be seen  (QD date of surgery then BID thereafter) to address the identified rehab impairments via gait training, therapeutic activities, therapeutic exercises and progress toward the following goals:    Plan of Care Expires:   (upon facility discharge)    Subjective     Chief Complaint: s/p left TKA, A-fib with RVR  Patient/Family Comments/goals: pain control, increase mobility   Pain/Comfort:  Pain Rating 1: 0/10    Patients cultural, spiritual, Sikh conflicts given the current situation: no    Living Environment:  Patient lives with his wife in a raised home with 17 steps to enter, b/l HR.  Prior to admission, patients level of function was independent.  Equipment used at home: walker, rolling, cane, straight, raised toliet.  DME owned (not currently used): none.  Upon discharge, patient will have assistance from his wife.    Objective:     Communicated with RN prior to session.  Patient found HOB elevated with blood pressure cuff, cryotherapy, peripheral IV, pulse ox (continuous),  "telemetry  upon PT entry to room.    General Precautions: Standard, fall   Orthopedic Precautions:LLE weight bearing as tolerated (aggressive knee extension exercises)   Braces: N/A  Respiratory Status: Room air    Exams:  Cognitive Exam:  Patient is oriented to Person, Place, Time, and Situation  RLE ROM: WFL  RLE Strength: WFL  LLE ROM: limited as would be expected following surgical procedure performed this morning and orthopedic precautions  LLE Strength: limited as would be expected following surgical procedure performed this morning and orthopedic precautions    Functional Mobility:  Bed Mobility:  Supine to Sit: minimum assistance  Sit to Supine: minimum assistance  Transfers:  Sit to Stand:  contact guard assistance with rolling walker and verbal cueing for hand placement and correct technique  Gait: patient ambulated 60' with CGA using RW, WBAT LLE, verbal cueing for erect posture, and sequencing    Treatment and Education:  Patient performed bed mobility with min assist and verbal cueing for technique. He performed functional transfer with CGA using RW and requiring verbal cueing for hand placement and technique. Patient performed the following exercises per TKA protocol x 10 reps each to include supine AP's, supine QS's with 3 " hold, assisted SLR in supine, seated heel slides, and seated LAQ's. Patient ambulated 60' with CGA using RW, WBAT LLE, verbal cueing for erect posture and sequencing.   Patient educated in WBAT LLE, use of ice packs to left knee several times throughout the day to manage pain and swelling, importance of achieving TKE as soon as possible to prevent contractures and restore normal knee biomechanics during walking and standing, positioning of LLE to facilitate knee extension via use of folded pillow under heel. Patient educated in call light use, importance of OOB activity and functional mobility to negate the negative effects of prolonged bed rest during this hospitalization, safe " transfers/ambulation and discharge planning recommendations/options.       AM-PAC 6 CLICK MOBILITY  Total Score:      Patient left HOB elevated with all lines intact, call button in reach, RN notified, spouse present, and cryotherapy refreshed and in place, folded pillow under heel to facilitate extension .    GOALS:   Patient mod I with all aspects of bed mobility  Patient to require SBA for transfers using RW  Patient to ambulate 100' with SBA using RW  Patient to demonstrate independence with LLE exercises per TKA protocol      History:     Past Medical History:   Diagnosis Date    A-fib     Diabetes mellitus, type 2     Hypertension     Primary osteoarthritis of left knee 9/19/2022       Past Surgical History:   Procedure Laterality Date    JOINT REPLACEMENT  2013    Knee    KNEE ARTHROSCOPY         Time Tracking:     PT Received On: 09/19/22  PT Start Time: 1528     PT Stop Time: 1612  PT Total Time (min): 44 min     Billable Minutes: Evaluation 12 min, Gait Training 20 min, and Therapeutic Exercise 12 min      09/19/2022

## 2022-09-19 NOTE — PLAN OF CARE
Pt gave verbal consent for bedside procedure, and Local block performed by Dr Waggoner for post-op pain mgmt. Tolerated well.

## 2022-09-19 NOTE — OP NOTE
"[facility]  Orthopedic Surgery    9/19/2022    Bubba Gtz  [id number]      PREOPERATIVE DIAGNOSIS: Primary osteoarthritis of left knee [M17.12]    POSTOPERATIVE DIAGNOSIS:  Symptomatic tricompartmental arthritis, left knee.    PROCEDURE:  Left total knee arthroplasty with Depuy Sigma size 5 cemented cruciate retaining femoral component, size 5 cemented tibial component, 10 mm curved plus tibial poly, and 38 mm cemented patella button.    SURGEON: Ludwin Palacio D.O.    ASSISTANT: Orton Grinnell, CFA    ANESTHESIA:  General.    BLOOD LOSS:  Less than 20 cc.    TOURNIQUET:  59 minutes    DRAINS:  None.    PATHOLOGY:  Bone cuts and synovium    COMPLICATION:  None.    INDICATIONS FOR PROCEDURE:   Mr. Gtz is a 72-year-old gentleman who has had 1-1/2 years of left knee pain increasing intensity over the last 6-8 months.  His pain began after he twisted his knee when he fell down some stairs.  Now flexing his knee, playing pickle ball, and bowling increases symptoms while rest improves them.  He stated, I can walk a little bit, but then it gets painfully worse".  He gets a feeling of giving way but denied swelling or locking.  He has taken NSAIDs with help.  He has worn a brace without help.  He has had multiple injections which have ceased to help.  He can ambulate approximately 1-2 miles and climb 15 stairs.  He does not use an ambulatory assistive device.   He elected to proceed with surgery after he failed conservative management and complications to include bleeding, infection, scarring, nerve/blood vessel/tendon damage, need for further surgery, failed surgery, failure to improve, stiffness, skin slough, leg length discrepancy, malrotation, and possible amputation were discussed.  He signed a consent.    PROCEDURE IN DETAIL:   The patient was brought to the operating room was transferred to the operating bed where all bony prominences were well padded.  General anesthesia was then " administered by the Anesthesiology Department.  After general anesthesia was administered a tourniquet was applied to the upper part of the patient's left lower extremity.  The patient's left leg was then prepped with chlorhexidine solution and draped in the normal sterile fashion.  After prepping and draping bony and soft tissue landmarks were palpated and an anterior longitudinal incision site was drawn over the patient's left knee.  His leg was then elevated, exsanguinated, and the tourniquet was inflated.         Sharp incision was then made with a #10 blade followed by dissection to the level of the extensor mechanism.  A medial parapatellar incision was then marked and made with a #10 blade.  The patient's patella was then everted and the patient's knee was flexed.  A Reamer was then utilized to ream the distal femur followed by placement of an intramedullary tim with a distal femoral cutting guide on it.  The distal cutting guide was then pinned in place and the tim was removed from the patient's femur.  An Lobo wing was utilized to check the cut.  When the cut was ensured appropriate an oscillating saw was utilized to make a distal femoral cut.  The distal femoral cutting guide was removed along with the pins and then a distal femoral sizing guide was placed on the distal femur and the patient was found to be a size 5.  Pins were then placed and then the distal femoral sizing guide was removed.  A distal femoral osteotomy guide was then placed on the pins and a Lobo wing was utilized to ensure that no notching of the anterior cortex would ensue.  After ensuring that no notching of the anterior cortex would ensue the distal femoral osteotomy guide was pinned in place with screw pins.  The previously placed pins from the sizing guide were then removed.  An oscillating saw was then utilized to make an anterior cut, posterior cut, posterior chamfer cut/anterior chamfer cut.  After all cuts were made the distal  femoral osteotomy guide was removed and the bone fragments were removed from the patient's knee.       The proximal tibia was then presented and a external cutting guide was placed on the proximal tibia and held in place taking 2 mm off the low side.  It was then pinned in place after checking then appropriate slope was going to be obtained.  After pinning in place an Lobo wing was utilized to check the cut once an appropriate cut was ensured an oscillating saw was utilized to make a proximal tibia cut.  The tibial bone fragment was then removed from the patient's knee.  A sizing guide was then placed in the patient's knee and tensioning was checked with the patient's knee in flexion and extension.  Good tensioning of the tissues was noted with good alignment.  The sizing guide was removed and then the proximal tibia was presented and progressive trial tibial trays were placed until an appropriate sized tray was chosen.  A drop tim was utilized to ensure appropriate alignment and then the tray was pinned in place.  After pinning the tray in place a tower reaming guide was placed on the tray and the proximal tibia was reamed followed by placement of a wedge.  The tower reaming guide was then removed along with the retaining pins.        A distal femoral trial component was then placed followed by progressive trial tibial polys.  The patient's knee was put through full motion and found to be stable with good extension and flexion.  Attention was then placed on the patella where a caliper was utilized to check the thickness and then a patella cutting guide was placed on the patella and an oscillating saw was utilized to make a patella osteotomy.  Progressive patella sizing guides were placed until an appropriate sized patella sizing guide was chosen and then lug holes were drilled after medialized and the patella.  A patella trial poly was then placed in the patient's knee was put through full motion utilizing the  no-touch technique good tracking was noted.  The patella trial was then removed and the patient's knee was flexed.  Distal femoral lug holes were then drilled.  All trials were then removed from the patient's knee.       Patient's knee was then extended and extensively irrigated.  After irrigation the proximal tibia was presented and a permanent proximal tibia component was cemented into place followed by cementing of a permanent femoral component.  A trial poly was then placed in the patient's knee was extended.  A final permanent patella button was cemented into place and held in place with compression device.  Thrombin-soaked sponges were then placed in the patient's knee followed by an Ace wrap.  The cement was allowed to cure.  After the cement had cured the tourniquet was released.  The thrombin-soaked sponges were then removed from the patient's knee along with the patella compression device.  Full hemostasis was ensured.  Progressive tibial polys were then placed until an appropriate size tibial poly was chosen and a final permanent tibial poly was placed.  Patient's knee was again copiously irrigated.       The extensor mechanism was then reapproximated to itself with FiberWire suture.  The incision was then closed in layers with Vicryl suture with final plastic closure.  His incision was dressed with Mastisol, Steri-Strips, Adaptic, and silver alginate aqua seal dressing.  He was then awakened by anesthesia and transferred from the operating room to the recovery room in stable condition.  He tolerated the procedure well without complication.

## 2022-09-19 NOTE — PT/OT/SLP EVAL
OT order received and chart reviewed.  OT evaluation withheld on this date per MD request due to patient's current medical condition.  OT will continue to follow up and provide skilled OT intervention when appropriate.      Dontrell Murphy, OTR/L

## 2022-09-19 NOTE — ANESTHESIA PROCEDURE NOTES
Peripheral Block    Patient location during procedure: pre-op   Block not for primary anesthetic.  Reason for block: at surgeon's request and post-op pain management   Post-op Pain Location: Left Knee   Start time: 9/19/2022 7:22 AM  Timeout: 9/19/2022 7:17 AM   End time: 9/19/2022 7:24 AM    Staffing  Authorizing Provider: Dax Waggoner MD  Performing Provider: Dax Waggoner MD    Staffing  Other anesthesia staff: William Lazar II, CRNA  Preanesthetic Checklist  Completed: patient identified, IV checked, site marked, risks and benefits discussed, surgical consent, monitors and equipment checked, pre-op evaluation and timeout performed  Peripheral Block  Patient position: supine  Prep: ChloraPrep  Patient monitoring: heart rate, cardiac monitor, continuous pulse ox and frequent blood pressure checks  Block type: adductor canal  Laterality: left  Injection technique: single shot  Needle  Needle type: Echogenic   Needle gauge: 20 G  Needle length: 4 in  Needle localization: anatomical landmarks and ultrasound guidance  Test dose: negative     Assessment  Injection assessment: negative aspiration, negative parasthesia and local visualized surrounding nerve  Paresthesia pain: none  Heart rate change: no  Slow fractionated injection: yes  Pain Tolerance: comfortable throughout block and no complaints  Medications:    Medications: ropivacaine (NAROPIN) injection 0.5% - Perineural   20 mL - 9/19/2022 7:22:00 AM

## 2022-09-19 NOTE — ASSESSMENT & PLAN NOTE
Patient's FSGs are controlled on current medication regimen.  Last A1c reviewed- No results found for: LABA1C, HGBA1C  Most recent fingerstick glucose reviewed- Recent Labs   Lab 09/19/22  0656 09/19/22  1015   POCTGLUCOSE 197* 202*     Current correctional scale  Low  Maintain anti-hyperglycemic dose as follows-   Antihyperglycemics (From admission, onward)    None        Hold Oral hypoglycemics while patient is in the hospital.

## 2022-09-19 NOTE — TRANSFER OF CARE
"Anesthesia Transfer of Care Note    Patient: Bbuba Gtz    Procedure(s) Performed: Procedure(s) (LRB):  LEFT ARTHROPLASTY, KNEE, TOTAL (Left)    Patient location: PACU    Anesthesia Type: general    Transport from OR: Transported from OR on room air with adequate spontaneous ventilation    Post pain: adequate analgesia    Post assessment: no apparent anesthetic complications    Post vital signs: stable    Level of consciousness: sedated and responds to stimulation    Nausea/Vomiting: no nausea/vomiting    Complications: none    Transfer of care protocol was followed      Last vitals:   Visit Vitals  BP (!) 166/100 (BP Location: Left arm, Patient Position: Lying)   Pulse 97   Temp 36.8 °C (98.3 °F) (Oral)   Resp 20   Ht 6' 1" (1.854 m)   Wt 112.5 kg (248 lb)   SpO2 96%   BMI 32.72 kg/m²     "

## 2022-09-19 NOTE — ASSESSMENT & PLAN NOTE
S/P total left knee replacement today with ortho  Planned to discharge from PACU but patient went into Afib w/ RVR  PT/OT ordered  Will follow up with ortho outpatient  PRN Pain control

## 2022-09-19 NOTE — NURSING
Patient arrived to ICU 11 via trapeze bed.  Afib on monitor, HR controlled in the 80s-90s.  Patient AAOx4, respirations even and unlabored, O2 sat 99% on room air.  Polar pack and ace bandage noted around left knee.  Pillow placed under left ankle.  Urinal and call light within reach.  Wife with patient at bedside, no distress noted or verbalized by patient at present.

## 2022-09-20 VITALS
DIASTOLIC BLOOD PRESSURE: 87 MMHG | HEIGHT: 73 IN | SYSTOLIC BLOOD PRESSURE: 131 MMHG | WEIGHT: 266.75 LBS | RESPIRATION RATE: 17 BRPM | BODY MASS INDEX: 35.35 KG/M2 | OXYGEN SATURATION: 94 % | HEART RATE: 144 BPM | TEMPERATURE: 97 F

## 2022-09-20 LAB
ANION GAP SERPL CALC-SCNC: 14 MMOL/L (ref 8–16)
BUN SERPL-MCNC: 18 MG/DL (ref 8–23)
CALCIUM SERPL-MCNC: 8.8 MG/DL (ref 8.7–10.5)
CHLORIDE SERPL-SCNC: 101 MMOL/L (ref 95–110)
CO2 SERPL-SCNC: 19 MMOL/L (ref 23–29)
CREAT SERPL-MCNC: 1 MG/DL (ref 0.5–1.4)
EST. GFR  (NO RACE VARIABLE): >60 ML/MIN/1.73 M^2
GLUCOSE SERPL-MCNC: 215 MG/DL (ref 70–110)
HCT VFR BLD AUTO: 32.7 % (ref 40–54)
HGB BLD-MCNC: 11.5 G/DL (ref 14–18)
MAGNESIUM SERPL-MCNC: 2.1 MG/DL (ref 1.6–2.6)
POTASSIUM SERPL-SCNC: 4.4 MMOL/L (ref 3.5–5.1)
SODIUM SERPL-SCNC: 134 MMOL/L (ref 136–145)

## 2022-09-20 PROCEDURE — 85018 HEMOGLOBIN: CPT | Performed by: ORTHOPAEDIC SURGERY

## 2022-09-20 PROCEDURE — 36415 COLL VENOUS BLD VENIPUNCTURE: CPT | Performed by: STUDENT IN AN ORGANIZED HEALTH CARE EDUCATION/TRAINING PROGRAM

## 2022-09-20 PROCEDURE — 99239 PR HOSPITAL DISCHARGE DAY,>30 MIN: ICD-10-PCS | Mod: ,,, | Performed by: STUDENT IN AN ORGANIZED HEALTH CARE EDUCATION/TRAINING PROGRAM

## 2022-09-20 PROCEDURE — 97530 THERAPEUTIC ACTIVITIES: CPT

## 2022-09-20 PROCEDURE — 80048 BASIC METABOLIC PNL TOTAL CA: CPT | Performed by: STUDENT IN AN ORGANIZED HEALTH CARE EDUCATION/TRAINING PROGRAM

## 2022-09-20 PROCEDURE — 83735 ASSAY OF MAGNESIUM: CPT | Performed by: STUDENT IN AN ORGANIZED HEALTH CARE EDUCATION/TRAINING PROGRAM

## 2022-09-20 PROCEDURE — 99239 HOSP IP/OBS DSCHRG MGMT >30: CPT | Mod: ,,, | Performed by: STUDENT IN AN ORGANIZED HEALTH CARE EDUCATION/TRAINING PROGRAM

## 2022-09-20 PROCEDURE — 63600175 PHARM REV CODE 636 W HCPCS: Performed by: ORTHOPAEDIC SURGERY

## 2022-09-20 PROCEDURE — 85014 HEMATOCRIT: CPT | Performed by: ORTHOPAEDIC SURGERY

## 2022-09-20 PROCEDURE — 97535 SELF CARE MNGMENT TRAINING: CPT

## 2022-09-20 PROCEDURE — 97166 OT EVAL MOD COMPLEX 45 MIN: CPT

## 2022-09-20 RX ORDER — METOPROLOL SUCCINATE 25 MG/1
25 TABLET, EXTENDED RELEASE ORAL DAILY
Status: DISCONTINUED | OUTPATIENT
Start: 2022-09-20 | End: 2022-09-20 | Stop reason: HOSPADM

## 2022-09-20 RX ORDER — LIDOCAINE HYDROCHLORIDE 10 MG/ML
1 INJECTION, SOLUTION EPIDURAL; INFILTRATION; INTRACAUDAL; PERINEURAL ONCE
Status: DISCONTINUED | OUTPATIENT
Start: 2022-09-20 | End: 2022-09-20

## 2022-09-20 RX ORDER — FAMOTIDINE 10 MG/ML
20 INJECTION INTRAVENOUS ONCE
Status: DISCONTINUED | OUTPATIENT
Start: 2022-09-20 | End: 2022-09-20

## 2022-09-20 RX ORDER — SODIUM CHLORIDE, SODIUM LACTATE, POTASSIUM CHLORIDE, CALCIUM CHLORIDE 600; 310; 30; 20 MG/100ML; MG/100ML; MG/100ML; MG/100ML
INJECTION, SOLUTION INTRAVENOUS CONTINUOUS
Status: DISCONTINUED | OUTPATIENT
Start: 2022-09-20 | End: 2022-09-20

## 2022-09-20 RX ADMIN — CEFAZOLIN SODIUM 2 G: 2 SOLUTION INTRAVENOUS at 02:09

## 2022-09-20 NOTE — PLAN OF CARE
Problem: Adult Inpatient Plan of Care  Goal: Plan of Care Review  Outcome: Ongoing, Progressing  Goal: Patient-Specific Goal (Individualized)  Outcome: Ongoing, Progressing  Goal: Absence of Hospital-Acquired Illness or Injury  Outcome: Ongoing, Progressing  Goal: Optimal Comfort and Wellbeing  Outcome: Ongoing, Progressing  Goal: Readiness for Transition of Care  Outcome: Ongoing, Progressing     Problem: Infection  Goal: Absence of Infection Signs and Symptoms  Outcome: Ongoing, Progressing     Problem: Diabetes Comorbidity  Goal: Blood Glucose Level Within Targeted Range  Outcome: Ongoing, Progressing     Problem: Fall Injury Risk  Goal: Absence of Fall and Fall-Related Injury  Outcome: Ongoing, Progressing     Problem: Pain Acute  Goal: Acceptable Pain Control and Functional Ability  Outcome: Ongoing, Progressing   POC reviewed at bedside. Questions and concerns addressed. VSS. Placed bed in low and locked position. Call light within reach. Side rails up x2. Instructed to call for any needs. Verbalized understanding of all instructions. Frequent rounds.

## 2022-09-20 NOTE — NURSING
"PT STATES "I DON'T WANT TO GET MY CBG'S CHECKED BECAUSE I DON;T WANT INSULIN AND I TAKE METFORMIN AND [facility]LLED BY [facility].   "

## 2022-09-20 NOTE — NURSING
"Pt sitting in wheelchair stating "he's ready to go. Doc gave me the okay to leave so I'm ready." VS taken. Wife gave home medications of metoprolol and metformin. Notified Dr. Arreguin. Dr. Arreguin at bedside to discuss with patient.     0752- Pt requesting AMA forms. Pt understands risks of leaving AMA.   "

## 2022-09-20 NOTE — PLAN OF CARE
09/20/22 0800   Final Note   Assessment Type Final Discharge Note   Anticipated Discharge Disposition Left Against   What phone number can be called within the next 1-3 days to see how you are doing after discharge? [phone]   Hospital Resources/Appts/Education Provided Appointments scheduled and added to AVS   Patient sitting in wheelchair with wife at bedside & nurse taking out IV. He is signing out AMA. States he didn't want to stay yesterday but did. He has history of Afib & [facility]. Home health with [facility] was set up by Dr Palacio's office prior to surgery & they have already called spouse about seeing him at home. He already has follow up appointment with Dr Palacio. No other needs at this time.

## 2022-09-20 NOTE — PLAN OF CARE
Problem: Adult Inpatient Plan of Care  Goal: Plan of Care Review  9/20/2022 0359 by Annmarie Rivera RN  Outcome: Ongoing, Progressing  9/20/2022 0325 by Annmarie Rivera RN  Outcome: Ongoing, Progressing  Goal: Patient-Specific Goal (Individualized)  9/20/2022 0359 by Annmarie Rivera RN  Outcome: Ongoing, Progressing  9/20/2022 0325 by Annmarie Rivera RN  Outcome: Ongoing, Progressing  Goal: Absence of Hospital-Acquired Illness or Injury  9/20/2022 0359 by Annmarie Rivera RN  Outcome: Ongoing, Progressing  9/20/2022 0325 by Annmarie Rivera RN  Outcome: Ongoing, Progressing  Goal: Optimal Comfort and Wellbeing  9/20/2022 0359 by Annmarie Rivera RN  Outcome: Ongoing, Progressing  9/20/2022 0325 by Annmarie Rivera RN  Outcome: Ongoing, Progressing  Goal: Readiness for Transition of Care  9/20/2022 0359 by Annmarie Rivera RN  Outcome: Ongoing, Progressing  9/20/2022 0325 by Annmarie Rivera RN  Outcome: Ongoing, Progressing     Problem: Infection  Goal: Absence of Infection Signs and Symptoms  9/20/2022 0359 by Annmarie Rivera RN  Outcome: Ongoing, Progressing  9/20/2022 0325 by Annmarie Rivera RN  Outcome: Ongoing, Progressing     Problem: Diabetes Comorbidity  Goal: Blood Glucose Level Within Targeted Range  9/20/2022 0359 by Annmarie Rivera RN  Outcome: Ongoing, Progressing  9/20/2022 0325 by Annmarie Rivera RN  Outcome: Ongoing, Progressing     Problem: Fall Injury Risk  Goal: Absence of Fall and Fall-Related Injury  9/20/2022 0359 by Annmarie Rivera RN  Outcome: Ongoing, Progressing  9/20/2022 0325 by Annmarie Rivera RN  Outcome: Ongoing, Progressing     Problem: Pain Acute  Goal: Acceptable Pain Control and Functional Ability  9/20/2022 0359 by Annmarie Rivera RN  Outcome: Ongoing, Progressing  9/20/2022 0325 by Annmarie Rivera RN  Outcome: Ongoing, Progressing   POC reviewed at bedside. Questions and concerns addressed. VSS. Placed bed in low and locked position. Call light within reach. Side rails up x2. Instructed to call for any needs.  Verbalized understanding of all instructions. Frequent rounds.

## 2022-09-20 NOTE — PLAN OF CARE
09/19/22 1500   Discharge Assessment   Assessment Type Discharge Planning Assessment   Confirmed/corrected address, phone number and insurance Yes   Confirmed Demographics Correct on Facesheet   Source of Information patient   When was your last doctors appointment?   (July)   Communicated STEFAN with patient/caregiver Yes   Reason For Admission knee replacement   Lives With spouse   Do you expect to return to your current living situation? Yes   Do you have help at home or someone to help you manage your care at home? Yes   Who are your caregiver(s) and their phone number(s)? Minda Gtz spouse [phone]   Prior to hospitilization cognitive status: Alert/Oriented   Current cognitive status: Alert/Oriented   Walking or Climbing Stairs Difficulty none   Dressing/Bathing Difficulty none   Home Accessibility not wheelchair accessible;stairs to enter home   Home Layout Bathroom on 2nd floor;Bedroom on 2nd floor   Equipment Currently Used at Home walker, rolling;cane, straight   Readmission within 30 days? No   Patient currently being followed by outpatient case management? No   Do you currently have service(s) that help you manage your care at home? No   Do you take prescription medications? Yes   Do you have prescription coverage? Yes   [facility]   Do you have any problems affording any of your prescribed medications? No   Is the patient taking medications as prescribed? yes   Who is going to help you get home at discharge? Minda Gtz spouse [phone]   How do you get to doctors appointments? car, drives self   Are you on dialysis? No   Do you take coumadin? No   Discharge Plan A Home Health   Discharge Plan B Home with family   DME Needed Upon Discharge  none   Discharge Plan discussed with: Patient   Discharge Barriers Identified None   Physical Activity   On average, how many days per week do you engage in moderate to strenuous exercise (like a brisk walk)? 3 days   On average, how many minutes do  you engage in exercise at this level? 30 min   Financial Resource Strain   How hard is it for you to pay for the very basics like food, housing, medical care, and heating? Somewhat   Housing Stability   In the last 12 months, was there a time when you were not able to pay the mortgage or rent on time? N   In the last 12 months, how many places have you lived? 1   In the last 12 months, was there a time when you did not have a steady place to sleep or slept in a shelter (including now)? N   Transportation Needs   In the past 12 months, has lack of transportation kept you from medical appointments or from getting medications? no   In the past 12 months, has lack of transportation kept you from meetings, work, or from getting things needed for daily living? No   Food Insecurity   Within the past 12 months, you worried that your food would run out before you got the money to buy more. Never true   Within the past 12 months, the food you bought just didn't last and you didn't have money to get more. Never true   Stress   Do you feel stress - tense, restless, nervous, or anxious, or unable to sleep at night because your mind is troubled all the time - these days? Only a littl   Social Connections   In a typical week, how many times do you talk on the phone with family, friends, or neighbors? More than 3   How often do you get together with friends or relatives? More than 3   How often do you attend Adventist or Denominational services? Never   Do you belong to any clubs or organizations such as Adventist groups, unions, fraternal or athletic groups, or school groups? Yes   How often do you attend meetings of the clubs or organizations you belong to? More than 4   Are you , , , , never , or living with a partner?    Alcohol Use   Q1: How often do you have a drink containing alcohol? 2-3 per wk   Q2: How many drinks containing alcohol do you have on a typical day when you are drinking? 1 or  2   Q3: How often do you have six or more drinks on one occasion? Monthly   Relationship/Environment   Name(s) of Who Lives With Patient Minda Gtz spouse [phone]   Assessment was done prior to admit over the phone by Irena Inman RN. He lives at home with his spouse. He has a rolling walker at home. He has steps to get into his home & will need stair training prior to going home. [facility] He [facility] with [facility]. His wife will be with him when he goes home. Denies any other needs.

## 2022-09-20 NOTE — PT/OT/SLP EVAL
Occupational Therapy   Evaluation    Name: Bubba Gtz  MRN: [id number]  Admitting Diagnosis:  Atrial fibrillation with RVR  Recent Surgery: Procedure(s) (LRB):  LEFT ARTHROPLASTY, KNEE, TOTAL (Left) 1 Day Post-Op    Recommendations:     Discharge Recommendations:  Home with home health  Discharge Equipment Recommendations:   None   Barriers to discharge:   None    Assessment:   73 yo w/ hx of chronic Afib, NIDDM2, HTN being admitted post-operatively following elective knee replacement after patient went into Afib w/ RVR post op. Patient given multiple doses IV metoprolol and HR continued to be elevated. Patient was then placed on diltiazem gtt and hospital medicine was called. Patient doing well. Not complaining of CP or SOB. Patient has had Afib for roughly 10 years. Has not seen cardiologist in 4 years.    Rehab Prognosis: Good      Plan:     Patient to be seen   to address the above listed problems via    Plan of Care Expires:  When patient is discharged from hospital.  Patient will be treated daily while hospitalized.  Plan of Care Reviewed with:  Patient     Subjective       Occupational Profile:  Living Environment: Patient lives in an elevated home with 15 steps to enter.  Previous level of function: Independent   Equipment Used at Home:   Rolling walker and straight cane.  Assistance upon Discharge: Patient's wife    Pain/Comfort:   3/10 left knee        Objective:     Communicated with: OT spoke with patient's nurse prior to entering patient's room.    General Precautions: Standard,     Orthopedic Precautions:    Braces:    Respiratory Status: Room air    Occupational Performance:    Bed Mobility:    Patient is modified independent with bed mobility.    Functional Mobility/Transfers:  Patient is modified independent with functional transfer and when ambulating 100 feet with rolling walker.  Patient is also modified independent when navigating steps.    Activities of Daily Living:  Patient is modified  independent with UE dressing, grooming, and toileting.  Patient requires Malcolm with LE dressing and bathing.    Cognitive/Visual Perceptual:  Patient is alert and oriented x4.      Treatment & Education:  Patient tolerated evaluation well.  Patient was educated on LE dressing and navigating steps.        GOALS:   Patient will be independent with toileting.  Patient will be modified independent with LE dressing.  Patient will require SBA with bathing.      History:     Past Medical History:   Diagnosis Date    A-fib     Diabetes mellitus, type 2     Hypertension     Primary osteoarthritis of left knee 9/19/2022         Past Surgical History:   Procedure Laterality Date    JOINT REPLACEMENT  2013    Knee    KNEE ARTHROSCOPY         Time Tracking:     OT Date of Treatment:  9/20/2022  OT Start Time:  6:30  OT Stop Time:  7:00  OT Total Time (min):  30 minutes     Dontrell Murphy, OTR/L    9/20/2022

## 2022-09-20 NOTE — NURSING
PT signed AMA forms. Transported off unit with all belongings in hand. IV removed. Catheter intact. Tele removed and returned to monitor room.

## 2022-09-21 ENCOUNTER — TELEPHONE (OUTPATIENT)
Dept: ORTHOPEDICS | Facility: CLINIC | Age: 72
End: 2022-09-21
Payer: OTHER GOVERNMENT

## 2022-09-21 NOTE — TELEPHONE ENCOUNTER
Spoke with Ms Perla (Mr Sapp was taking a nap). Informed her that his home health order was in and that physical therapy would not be ordered until after his post op appointment.    ----- Message from Breana Mahan sent at 9/21/2022 12:30 PM CDT -----  Regarding: [facility] called  Name of Who is Calling: BROOKLYN ALFONSO [[id number]] mily      What is the request in detail: requesting home health order for wound care , and physical therapy  orders post knee surgery. Please advise       Can the clinic reply by [facility]: No      What Number [facility]: [phone] fax # [phone]

## 2022-09-23 ENCOUNTER — TELEPHONE (OUTPATIENT)
Dept: ORTHOPEDICS | Facility: CLINIC | Age: 72
End: 2022-09-23
Payer: OTHER GOVERNMENT

## 2022-09-23 NOTE — TELEPHONE ENCOUNTER
Returned call. Chelsey with [facility] stated the patient told her staff that he did not need physical therapy. Patient does need home health and is to exercise his knee according to the last note from Dr. Palacio.     ----- Message from Sue Aivla sent at 9/23/2022  3:15 PM CDT -----  Contact: [facility] with [facility]  Type: Needs Medical Advice    Who Called: [facility] with [facility]  Best Call Back Number: [phone]  Inquiry/Question: please call back in regards to therapy      . Thank you~

## 2022-09-26 ENCOUNTER — TELEPHONE (OUTPATIENT)
Dept: ORTHOPEDICS | Facility: CLINIC | Age: 72
End: 2022-09-26
Payer: OTHER GOVERNMENT

## 2022-09-26 NOTE — DISCHARGE SUMMARY
[facility] Medicine  Discharge Summary      Patient Name: Bubba Gtz  MRN: [id number]  Patient Class: IP- Inpatient  Admission Date: 9/19/2022  Hospital Length of Stay: 1 days  Discharge Date and Time: 9/20/2022  8:10 AM  Attending Physician: No att. providers found   Discharging Provider: Gómez Arreguin MD  Primary Care Provider: [facility]      HPI:   71 yo w/ hx of chronic Afib, NIDDM2, HTN being admitted post-operatively following elective knee replacement after patient went into Afib w/ RVR post op. Patient given multiple doses IV metoprolol and HR continued to be elevated. Patient was then placed on diltiazem gtt and hospital medicine was called. Patient doing well. Not complaining of CP or SOB. Patient has had Afib for roughly 10 years. Has not seen cardiologist in 4 years. Not on AC outpatient.       Procedure(s) (LRB):  LEFT ARTHROPLASTY, KNEE, TOTAL (Left)      Hospital Course:   71 yo w/ hx of chronic Afib, NIDDM2, HTN being admitted post-operatively following elective knee replacement after patient went into Afib w/ RVR post op. Patient initially on diltiazem gtt but able to be transitioned off quickly and put on home metoprolol dose. Spoke with patient at admit and advised him to be on AC going forward for stroke prevention. He was agreeable to this. Morning after admit patient back in afib w/ RVR w/ sustained rates in the 150's. Patient asymptomatic without chest pain or signs of presycnope. Patient advised to stay inpatient while we further titrated his beta blocker vs possibly going back on diltiazem gtt. He did not feel the needs to stay inpatient and decided to leave AMA. Patient was provided the risks of leaving AMA including syncope, HF, or death. He was also informed of the risk of stroke without starting AC. He states that he will follow up with his PCP to begin this. It is my opinion that patient had a good understanding of the risks of  leaving AMA and had capacity to make decisions at the time of our conversation.        Goals of Care Treatment Preferences:  Code Status: Full Code      Consults:     No new Assessment & Plan notes have been filed under this hospital service since the last note was generated.  Service: Hospital Medicine    Final Active Diagnoses:    Diagnosis Date Noted POA    PRINCIPAL PROBLEM:  Atrial fibrillation with RVR [I48.91] 09/19/2022 Yes    Status post total left knee replacement [Z96.652] 09/19/2022 Not Applicable    Non-insulin dependent type 2 diabetes mellitus [E11.9] 09/19/2022 Yes      Problems Resolved During this Admission:    Diagnosis Date Noted Date Resolved POA    Primary osteoarthritis of left knee [M17.12] 09/19/2022 09/19/2022 Yes       Discharged Condition: against medical advice    Disposition: Left Against Medical Adv*    Follow Up:   Follow-up Information     Ludwin Palacio DO. Go on 10/5/2022.    Specialty: Orthopedic Surgery  Why: appointment Wednesday Oct 5th at 8:00am For wound re-check  Contact information:  [address]  [place] [address] [phone]             [facility] Follow up.    Specialty: Home Health Services  Why: will provide home health services; new name is [facility]  Contact information:  [address] [facility] [place] [address] [phone]                       Patient Instructions:      Diet Adult Regular     Keep surgical extremity elevated     Ice to affected area     Other restrictions (specify):   Order Comments: 1. Elevate and ice left knee.    2. May walk with crutches or a walker with weight-bearing at tolerance to the left lower extremity.    3. Do not remove dressing.  4. May shower beginning tomorrow may allow water to run over dressing it is a sealed waterproof dressing.  Do not soak in a tub.    5. Begin taking anticoagulant (Blood Thinner) at 8:00 a.m. tonight.  6. Do aggressive knee extension exercises.     7. Do not place any pillows under knee do not flex bed at knee.  Placed 2 pillows to 3 pillows under left heel when seated or in bed to allow the weight of the left leg to get extension in the left knee.     Notify your health care provider if you experience any of the following:  temperature >100.4     Leave dressing on - Keep it clean, dry, and intact until clinic visit   Order Comments: Do not remove dressing.  The dressing is a sealed waterproof dressing so you can shower but do not soak in a tub.     Shower on day dressing removed (No bath)   Order Comments: Do not remove dressing!  May shower beginning tomorrow may allow water to run over dressing it is a sealed waterproof dressing.     Weight bearing restrictions (specify):   Order Comments: Walk with weight-bearing at tolerance to the left lower extremity.       Significant Diagnostic Studies: Labs: CMP No results for input(s): NA, K, CL, CO2, GLU, BUN, CREATININE, CALCIUM, PROT, ALBUMIN, BILITOT, ALKPHOS, AST, ALT, ANIONGAP, ESTGFRAFRICA, EGFRNONAA in the last 48 hours.    Pending Diagnostic Studies:     None         Medications:  Reconciled Home Medications:      Medication List      CONTINUE taking these medications    metFORMIN 1000 MG tablet  Commonly known as: GLUCOPHAGE  500 mg.     metoprolol succinate 25 MG 24 hr tablet  Commonly known as: TOPROL-XL  Take 25 mg by mouth once daily.     sildenafiL 100 MG tablet  Commonly known as: VIAGRA  100 mg.            Indwelling Lines/Drains at time of discharge:   Lines/Drains/Airways     None                 Time spent on the discharge of patient: 45 minutes         Gómez Arreguin MD  Department of Hospital Medicine  [facility]

## 2022-09-26 NOTE — HOSPITAL COURSE
73 yo w/ hx of chronic Afib, NIDDM2, HTN being admitted post-operatively following elective knee replacement after patient went into Afib w/ RVR post op. Patient initially on diltiazem gtt but able to be transitioned off quickly and put on home metoprolol dose. Spoke with patient at admit and advised him to be on AC going forward for stroke prevention. He was agreeable to this. Morning after admit patient back in afib w/ RVR w/ sustained rates in the 150's. Patient asymptomatic without chest pain or signs of presycnope. Patient advised to stay inpatient while we further titrated his beta blocker vs possibly going back on diltiazem gtt. He did not feel the needs to stay inpatient and decided to leave AMA. Patient was provided the risks of leaving AMA including syncope, HF, or death. He was also informed of the risk of stroke without starting AC. He states that he will follow up with his PCP to begin this. It is my opinion that patient had a good understanding of the risks of leaving AMA and had capacity to make decisions at the time of our conversation.

## 2022-09-26 NOTE — TELEPHONE ENCOUNTER
Returned call. Patient's post op bandage was taken off today. Patient's home health was trying to find a bandage similar to cover the surgical incision until the patient comes in for his first post operative appointment.    ----- Message from Gigi Wang MA sent at 9/26/2022  3:52 PM CDT -----  Contact: [facility]  [facility] called in and stated she was returning call to office regarding attached patient.    Call back number is [phone]

## 2022-09-27 ENCOUNTER — PATIENT MESSAGE (OUTPATIENT)
Dept: ORTHOPEDICS | Facility: CLINIC | Age: 72
End: 2022-09-27
Payer: OTHER GOVERNMENT

## 2022-09-30 ENCOUNTER — OFFICE VISIT (OUTPATIENT)
Dept: ORTHOPEDICS | Facility: CLINIC | Age: 72
End: 2022-09-30
Payer: OTHER GOVERNMENT

## 2022-09-30 ENCOUNTER — HOSPITAL ENCOUNTER (OUTPATIENT)
Dept: RADIOLOGY | Facility: HOSPITAL | Age: 72
Discharge: HOME | End: 2022-09-30
Attending: ORTHOPAEDIC SURGERY
Payer: OTHER GOVERNMENT

## 2022-09-30 VITALS — HEIGHT: 73 IN | RESPIRATION RATE: 14 BRPM | WEIGHT: 266.75 LBS | BODY MASS INDEX: 35.35 KG/M2

## 2022-09-30 DIAGNOSIS — Z96.652 STATUS POST TOTAL KNEE REPLACEMENT USING CEMENT, LEFT: Primary | ICD-10-CM

## 2022-09-30 DIAGNOSIS — M79.662 PAIN OF LEFT CALF: ICD-10-CM

## 2022-09-30 DIAGNOSIS — Z96.652 STATUS POST TOTAL KNEE REPLACEMENT USING CEMENT, LEFT: ICD-10-CM

## 2022-09-30 PROCEDURE — 93971 EXTREMITY STUDY: CPT | Mod: TC,LT

## 2022-09-30 PROCEDURE — 99212 OFFICE O/P EST SF 10 MIN: CPT | Mod: PBBFAC,PN,25 | Performed by: ORTHOPAEDIC SURGERY

## 2022-09-30 PROCEDURE — 99999 PR PBB SHADOW E&M-EST. PATIENT-LVL II: ICD-10-PCS | Mod: PBBFAC,,, | Performed by: ORTHOPAEDIC SURGERY

## 2022-09-30 PROCEDURE — 99999 PR PBB SHADOW E&M-EST. PATIENT-LVL II: CPT | Mod: PBBFAC,,, | Performed by: ORTHOPAEDIC SURGERY

## 2022-09-30 PROCEDURE — 93971 US LOWER EXTREMITY VEINS LEFT: ICD-10-PCS | Mod: 26,LT,, | Performed by: RADIOLOGY

## 2022-09-30 PROCEDURE — 99024 POSTOP FOLLOW-UP VISIT: CPT | Mod: ,,, | Performed by: ORTHOPAEDIC SURGERY

## 2022-09-30 PROCEDURE — 93971 EXTREMITY STUDY: CPT | Mod: 26,LT,, | Performed by: RADIOLOGY

## 2022-09-30 PROCEDURE — 99024 PR POST-OP FOLLOW-UP VISIT: ICD-10-PCS | Mod: ,,, | Performed by: ORTHOPAEDIC SURGERY

## 2022-09-30 NOTE — PROGRESS NOTES
Subjective:      Patient ID: Bubba Gtz is a 72 y.o. male.    Chief Complaint: No chief complaint on file.      HPI:  Mr. Gtz returned today for his 1st postop visit after a left total knee arthroplasty.  His date of surgery 09/19/2022.  He stated he is doing well is ambulating well.  His home health nurse removed his silver alginate dressing several days ago.    ROS:  New diagnosis/surgery/prescriptions since last office visit on 08/24/2022:  Left total knee arthroplasty.  Constitutional: Negative for chills and fever.   HENT: Negative for congestion.    Eyes: Negative for blurred vision and pain.   Cardiovascular: Negative for chest pain and cyanosis.   Respiratory: Negative for cough and shortness of breath.    Endocrine: Negative for cold intolerance and polydipsia.   Hematologic/Lymphatic: Negative for adenopathy.   Skin: Negative for flushing, itching and skin cancer.   Musculoskeletal: Positive for joint pain. Negative for gout.   Gastrointestinal: Negative for constipation, diarrhea and heartburn.   Genitourinary: Negative for nocturia.   Neurological: Negative for headaches and seizures.   Psychiatric/Behavioral: Negative for depression and substance abuse. The patient is not nervous/anxious.    Allergic/Immunologic: Positive for environmental allergies.       Objective:      Physical Exam:   General: AAOx3.  No acute distress  Vascular:  Pulses intact and equal bilaterally.  Capillary refill less than 3 seconds and equal bilaterally  Neurologic:  Pinprick and soft touch intact and equal bilaterally  Integment:  Incision well approximated and healing well.  Extremity:  Knee: Extension/flexion with good motion bilaterally.  Minimal tenderness with motion.  No swelling.  Minimal tenderness with palpation.  Varus/valgus stressing with good endpoint bilaterally.  Mild tenderness with palpation left calf.  Radiography:  No new x-rays done today.      Assessment:       Impression:     1. Status post  total knee replacement using cement, left          Plan:       1.  Discussed physical examination with the patient. Bubba understands that he had a left total knee arthroplasty in appears to be doing well, but may have a DVT.  Explained the patient that he needs to be worked up although he is on Xarelto he could still develop a DVT.    2. Refer to outpatient physical therapy to start aggressive post total knee arthroplasty rehab.    3. Continue doing home exercises such as straight leg raises and knee extension exercises.  4. Any pain can be treated with over-the-counter medications dosed per box instructions.    5. Continue to ambulate with weight-bearing at tolerance to the left lower extremity.    6. Refer for a Doppler of the left lower extremity.  The patient understands if his Doppler is positive then he will need to go to the emergency room to have his anticoagulation adjusted.    7.  Follow up in 1 month with an x-ray of the left knee.

## 2022-11-01 ENCOUNTER — PATIENT MESSAGE (OUTPATIENT)
Dept: ORTHOPEDICS | Facility: CLINIC | Age: 72
End: 2022-11-01
Payer: OTHER GOVERNMENT

## 2022-11-01 DIAGNOSIS — Z96.652 STATUS POST TOTAL KNEE REPLACEMENT USING CEMENT, LEFT: Primary | ICD-10-CM

## 2022-11-02 ENCOUNTER — TELEPHONE (OUTPATIENT)
Dept: ORTHOPEDICS | Facility: CLINIC | Age: 72
End: 2022-11-02
Payer: OTHER GOVERNMENT

## 2022-11-02 NOTE — TELEPHONE ENCOUNTER
Returned call. I gave [facility] the [facility] referral number and the [facility] office number.     ----- Message from Catina Brandon sent at 11/2/2022  4:13 PM CDT -----  Contact: Patient  Type: Patient Call Back         Who called:  [facility]         What is the request in detail: calling in regards to patient receiving PT; states that the were told the payor would be the [facility], but states they have not received in documentation from the [facility] confirming auth; was not sure if they were awaiting information from staff or the [facility] but was calling to get an update from office; states she needs it in order to set anything in for patient; states if staff has supporting documents, please send; if not, please provider a number to [facility]; please advise         Best call back number: [phone]         Additional Information: fax [phone]           Thank You

## 2022-11-04 DIAGNOSIS — Z96.652 STATUS POST TOTAL KNEE REPLACEMENT USING CEMENT, LEFT: Primary | ICD-10-CM

## 2022-11-15 ENCOUNTER — OFFICE VISIT (OUTPATIENT)
Dept: ORTHOPEDICS | Facility: CLINIC | Age: 72
End: 2022-11-15
Payer: OTHER GOVERNMENT

## 2022-11-15 ENCOUNTER — HOSPITAL ENCOUNTER (OUTPATIENT)
Dept: RADIOLOGY | Facility: HOSPITAL | Age: 72
Discharge: HOME | End: 2022-11-15
Attending: ORTHOPAEDIC SURGERY
Payer: OTHER GOVERNMENT

## 2022-11-15 VITALS — RESPIRATION RATE: 18 BRPM | HEIGHT: 73 IN | WEIGHT: 266.75 LBS | BODY MASS INDEX: 35.35 KG/M2

## 2022-11-15 DIAGNOSIS — Z96.652 STATUS POST TOTAL KNEE REPLACEMENT USING CEMENT, LEFT: Primary | ICD-10-CM

## 2022-11-15 DIAGNOSIS — Z96.652 STATUS POST TOTAL KNEE REPLACEMENT USING CEMENT, LEFT: ICD-10-CM

## 2022-11-15 PROCEDURE — 99999 PR PBB SHADOW E&M-EST. PATIENT-LVL III: CPT | Mod: PBBFAC,,, | Performed by: ORTHOPAEDIC SURGERY

## 2022-11-15 PROCEDURE — 73562 XR KNEE 3 VIEW LEFT: ICD-10-PCS | Mod: 26,LT,, | Performed by: RADIOLOGY

## 2022-11-15 PROCEDURE — 99024 POSTOP FOLLOW-UP VISIT: CPT | Mod: ,,, | Performed by: ORTHOPAEDIC SURGERY

## 2022-11-15 PROCEDURE — 99213 OFFICE O/P EST LOW 20 MIN: CPT | Mod: PBBFAC,PN | Performed by: ORTHOPAEDIC SURGERY

## 2022-11-15 PROCEDURE — 73562 X-RAY EXAM OF KNEE 3: CPT | Mod: 26,LT,, | Performed by: RADIOLOGY

## 2022-11-15 PROCEDURE — 99024 PR POST-OP FOLLOW-UP VISIT: ICD-10-PCS | Mod: ,,, | Performed by: ORTHOPAEDIC SURGERY

## 2022-11-15 PROCEDURE — 73562 X-RAY EXAM OF KNEE 3: CPT | Mod: TC,PN,LT

## 2022-11-15 PROCEDURE — 99999 PR PBB SHADOW E&M-EST. PATIENT-LVL III: ICD-10-PCS | Mod: PBBFAC,,, | Performed by: ORTHOPAEDIC SURGERY

## 2022-11-15 NOTE — PROGRESS NOTES
Patient ID: Bubba Gtz is a 72 y.o. male.     Chief Complaint: No chief complaint on file.        HPI:  Mr. Gtz returned today for a 2 month visit after a left total knee arthroplasty.  His date of surgery 09/19/2022.  He stated he is doing well is ambulating well.  He has been going to physical therapy and feels he is doing well.     ROS:  No new diagnosis/surgery/prescriptions since last office visit on 09/30/2022  Constitutional: Negative for chills and fever.   HENT: Negative for congestion.    Eyes: Negative for blurred vision and pain.   Cardiovascular: Negative for chest pain and cyanosis.   Respiratory: Negative for cough and shortness of breath.    Endocrine: Negative for cold intolerance and polydipsia.   Hematologic/Lymphatic: Negative for adenopathy.   Skin: Negative for flushing, itching and skin cancer.   Musculoskeletal: Positive for joint pain. Negative for gout.   Gastrointestinal: Negative for constipation, diarrhea and heartburn.   Genitourinary: Negative for nocturia.   Neurological: Negative for headaches and seizures.   Psychiatric/Behavioral: Negative for depression and substance abuse. The patient is not nervous/anxious.    Allergic/Immunologic: Positive for environmental allergies.       Objective:   Physical Exam:   General: AAOx3.  No acute distress  Vascular:  Pulses intact and equal bilaterally.  Capillary refill less than 3 seconds and equal bilaterally  Neurologic:  Pinprick and soft touch intact and equal bilaterally  Integment:  Incision well approximated and well healed.  Extremity:  Knee: Extension/flexion with good motion bilaterally.  Nontender with motion.  No swelling.  Nontender with palpation.  Varus/valgus stressing with good endpoint bilaterally.  Calves soft Homans negative  Radiography:  Personally reviewed x-rays of the left knee completed on 11/15/2022 which showed a well-seated well-aligned total knee arthroplasty.      Assessment:       Impression:      1.  Status post total knee replacement using cement, left           Plan:       1.  Discussed physical examination with the patient. Bubba understands that he appears to be doing well and is moving his knee well..    2. Finish current physical therapy prescription and discharged to [facility].  3. Continue doing home exercises such as straight leg raises and knee extension exercises.  4. Any pain can be treated with over-the-counter medications dosed per box instructions.    5. Continue to ambulate with weight-bearing at tolerance to the left lower extremity.    6. Follow-up in 6 weeks with an x-ray of the left knee

## 2022-12-23 DIAGNOSIS — Z96.652 STATUS POST TOTAL KNEE REPLACEMENT USING CEMENT, LEFT: Primary | ICD-10-CM

## 2022-12-27 ENCOUNTER — OFFICE VISIT (OUTPATIENT)
Dept: ORTHOPEDICS | Facility: CLINIC | Age: 72
End: 2022-12-27
Payer: OTHER GOVERNMENT

## 2022-12-27 ENCOUNTER — HOSPITAL ENCOUNTER (OUTPATIENT)
Dept: RADIOLOGY | Facility: HOSPITAL | Age: 72
Discharge: HOME | End: 2022-12-27
Attending: ORTHOPAEDIC SURGERY
Payer: OTHER GOVERNMENT

## 2022-12-27 VITALS — HEIGHT: 73 IN | WEIGHT: 266.75 LBS | BODY MASS INDEX: 35.35 KG/M2 | RESPIRATION RATE: 20 BRPM

## 2022-12-27 DIAGNOSIS — Z96.652 STATUS POST TOTAL KNEE REPLACEMENT USING CEMENT, LEFT: Primary | ICD-10-CM

## 2022-12-27 DIAGNOSIS — Z96.652 STATUS POST TOTAL KNEE REPLACEMENT USING CEMENT, LEFT: ICD-10-CM

## 2022-12-27 PROCEDURE — 73562 XR KNEE 3 VIEW LEFT: ICD-10-PCS | Mod: 26,LT,, | Performed by: RADIOLOGY

## 2022-12-27 PROCEDURE — 99213 OFFICE O/P EST LOW 20 MIN: CPT | Mod: S$PBB,,, | Performed by: ORTHOPAEDIC SURGERY

## 2022-12-27 PROCEDURE — 99999 PR PBB SHADOW E&M-EST. PATIENT-LVL III: ICD-10-PCS | Mod: PBBFAC,,, | Performed by: ORTHOPAEDIC SURGERY

## 2022-12-27 PROCEDURE — 73562 X-RAY EXAM OF KNEE 3: CPT | Mod: 26,LT,, | Performed by: RADIOLOGY

## 2022-12-27 PROCEDURE — 99213 OFFICE O/P EST LOW 20 MIN: CPT | Mod: PBBFAC,PN | Performed by: ORTHOPAEDIC SURGERY

## 2022-12-27 PROCEDURE — 73562 X-RAY EXAM OF KNEE 3: CPT | Mod: TC,PN,LT

## 2022-12-27 PROCEDURE — 99213 PR OFFICE/OUTPT VISIT, EST, LEVL III, 20-29 MIN: ICD-10-PCS | Mod: S$PBB,,, | Performed by: ORTHOPAEDIC SURGERY

## 2022-12-27 PROCEDURE — 99999 PR PBB SHADOW E&M-EST. PATIENT-LVL III: CPT | Mod: PBBFAC,,, | Performed by: ORTHOPAEDIC SURGERY

## 2022-12-27 NOTE — PROGRESS NOTES
Patient ID: Bubba Gtz is a 72 y.o. male.     Chief Complaint: No chief complaint on file.        HPI:  Mr. Gtz returned today for a 3-1/2 month follow-up visit after a left total knee arthroplasty.  His date of surgery 09/19/2022.  He is doing well and is ambulating well.  He finished physical therapy he stated that they worked hard on his extension and flexion.  He would like to return to playing pickleball.  He is pain-free.     ROS:  No new diagnosis/surgery/prescriptions since last office visit on  11/15/2022  Constitutional: Negative for chills and fever.   HENT: Negative for congestion.    Eyes: Negative for blurred vision and pain.   Cardiovascular: Negative for chest pain and cyanosis.   Respiratory: Negative for cough and shortness of breath.    Endocrine: Negative for cold intolerance and polydipsia.   Hematologic/Lymphatic: Negative for adenopathy.   Skin: Negative for flushing, itching and skin cancer.   Musculoskeletal: Positive for joint pain. Negative for gout.   Gastrointestinal: Negative for constipation, diarrhea and heartburn.   Genitourinary: Negative for nocturia.   Neurological: Negative for headaches and seizures.   Psychiatric/Behavioral: Negative for depression and substance abuse. The patient is not nervous/anxious.    Allergic/Immunologic: Positive for environmental allergies.       Objective:   Physical Exam:   General: AAOx3.  No acute distress  Vascular:  Pulses intact and equal bilaterally.  Capillary refill less than 3 seconds and equal bilaterally  Neurologic:  Pinprick and soft touch intact and equal bilaterally  Integment:  Incision well approximated and well healed.  Extremity:  Knee: Extension/flexion  0/118 degrees equal bilaterally.  Nontender with motion.  No swelling.  Nontender with palpation.  Varus/valgus stressing with good endpoint bilaterally.  Calves soft Homans negative  Radiography:  Personally reviewed x-rays of the left knee completed on 12/ 27/2022  which showed a well-seated well-aligned total knee arthroplasty.      Assessment:       Impression:      1. Status post total knee replacement using cement, left           Plan:       1.  Discussed physical examination with the patient. Bubba understands that he is doing well and is progressing well.   2.   Continue doing home exercises such as quadriceps and hamstring strengthening and aggressive extensions.  3. Any minor pain can be treated with over-the-counter medications dosed per box instructions.    4.  May play pickleball ride a bike as tolerated.  Recommend he use a neoprene sleeve on his knee when playing pickleball skin give some minor added support.    5.  Follow-up in 6 months with an x-ray of the left knee.

## 2023-06-08 DIAGNOSIS — Z96.652 STATUS POST TOTAL KNEE REPLACEMENT USING CEMENT, LEFT: Primary | ICD-10-CM

## 2023-06-27 ENCOUNTER — HOSPITAL ENCOUNTER (OUTPATIENT)
Dept: RADIOLOGY | Facility: HOSPITAL | Age: 73
Discharge: HOME | End: 2023-06-27
Attending: ORTHOPAEDIC SURGERY

## 2023-06-27 ENCOUNTER — OFFICE VISIT (OUTPATIENT)
Dept: ORTHOPEDICS | Facility: CLINIC | Age: 73
End: 2023-06-27
Payer: OTHER GOVERNMENT

## 2023-06-27 VITALS — BODY MASS INDEX: 35.35 KG/M2 | WEIGHT: 266.75 LBS | HEIGHT: 73 IN | RESPIRATION RATE: 15 BRPM

## 2023-06-27 DIAGNOSIS — Z96.652 STATUS POST TOTAL KNEE REPLACEMENT USING CEMENT, LEFT: ICD-10-CM

## 2023-06-27 DIAGNOSIS — Z96.652 STATUS POST TOTAL KNEE REPLACEMENT USING CEMENT, LEFT: Primary | ICD-10-CM

## 2023-06-27 PROCEDURE — 99999 PR PBB SHADOW E&M-EST. PATIENT-LVL III: CPT | Mod: PBBFAC,,, | Performed by: ORTHOPAEDIC SURGERY

## 2023-06-27 PROCEDURE — 73562 XR KNEE 3 VIEW LEFT: ICD-10-PCS | Mod: 26,LT,, | Performed by: RADIOLOGY

## 2023-06-27 PROCEDURE — 99999 PR PBB SHADOW E&M-EST. PATIENT-LVL III: ICD-10-PCS | Mod: PBBFAC,,, | Performed by: ORTHOPAEDIC SURGERY

## 2023-06-27 PROCEDURE — 73562 X-RAY EXAM OF KNEE 3: CPT | Mod: TC,PN,LT

## 2023-06-27 PROCEDURE — 99213 OFFICE O/P EST LOW 20 MIN: CPT | Mod: S$PBB,ICN,, | Performed by: ORTHOPAEDIC SURGERY

## 2023-06-27 PROCEDURE — 73562 X-RAY EXAM OF KNEE 3: CPT | Mod: 26,LT,, | Performed by: RADIOLOGY

## 2023-06-27 PROCEDURE — 99213 PR OFFICE/OUTPT VISIT, EST, LEVL III, 20-29 MIN: ICD-10-PCS | Mod: S$PBB,ICN,, | Performed by: ORTHOPAEDIC SURGERY

## 2023-06-27 PROCEDURE — 99213 OFFICE O/P EST LOW 20 MIN: CPT | Mod: PBBFAC,PN | Performed by: ORTHOPAEDIC SURGERY

## 2023-06-27 NOTE — PROGRESS NOTES
Patient ID: Bubba Gtz is a 72 y.o. male.     Chief Complaint: No chief complaint on file.        HPI:  Mr. Gtz returned today for a 9 month follow-up visit after a left total knee arthroplasty.  His date of surgery 09/19/2022.  He is doing well and is ambulating well.  Has been walking distances and is going to take up pickleball next.  He stated he has no pain in his knees.     ROS:  No new diagnosis/surgery/prescriptions since last office visit on 12/27/2022.  Constitutional: Negative for chills and fever.   HENT: Negative for congestion.    Eyes: Negative for blurred vision and pain.   Cardiovascular: Negative for chest pain and cyanosis.   Respiratory: Negative for cough and shortness of breath.    Endocrine: Negative for cold intolerance and polydipsia.   Hematologic/Lymphatic: Negative for adenopathy.   Skin: Negative for flushing, itching and skin cancer.   Musculoskeletal: Positive for joint pain. Negative for gout.   Gastrointestinal: Negative for constipation, diarrhea and heartburn.   Genitourinary: Negative for nocturia.   Neurological: Negative for headaches and seizures.   Psychiatric/Behavioral: Negative for depression and substance abuse. The patient is not nervous/anxious.    Allergic/Immunologic: Positive for environmental allergies.       Objective:   Physical Exam:   General: AAOx3.  No acute distress  Vascular:  Pulses intact and equal bilaterally.  Capillary refill less than 3 seconds and equal bilaterally  Neurologic:  Pinprick and soft touch intact and equal bilaterally  Integment:  Incision well approximated and well healed.  Extremity:  Knee: Extension/flexion  0/118 degrees equal bilaterally.  Nontender with motion.  No swelling.  Nontender with palpation.  Varus/valgus stressing with good endpoint bilaterally.  Calves soft Homans negative  Radiography:  Personally reviewed x-rays of the left knee completed on 06/27/2023 which showed a well-seated well-aligned total knee  arthroplasty.      Assessment:       Impression:      1. Status post total knee replacement using cement, left           Plan:       1.  Discussed physical examination with the patient. Bubba understands that he is doing well and is progressing well.   2.   Continue doing home exercises such as quadriceps and hamstring strengthening and aggressive extensions.  3. Any minor pain can be treated with over-the-counter medications dosed per box instructions.    4.  May play pickleball ride a bike as tolerated.  Recommend he use a neoprene sleeve on his knee when playing pickleball skin give some minor added support.    5.  Follow-up in 3 months with an x-ray of the left knee for final evaluation

## 2023-07-26 ENCOUNTER — TELEPHONE (OUTPATIENT)
Dept: PODIATRY | Facility: CLINIC | Age: 73
End: 2023-07-26
Payer: OTHER GOVERNMENT

## 2023-07-26 ENCOUNTER — PATIENT MESSAGE (OUTPATIENT)
Dept: PODIATRY | Facility: CLINIC | Age: 73
End: 2023-07-26
Payer: OTHER GOVERNMENT

## 2023-07-26 ENCOUNTER — HOSPITAL ENCOUNTER (INPATIENT)
Facility: HOSPITAL | Age: 73
LOS: 5 days | Discharge: HOME | DRG: 854 | End: 2023-07-31
Attending: EMERGENCY MEDICINE | Admitting: STUDENT IN AN ORGANIZED HEALTH CARE EDUCATION/TRAINING PROGRAM
Payer: OTHER GOVERNMENT

## 2023-07-26 DIAGNOSIS — R07.9 CHEST PAIN: ICD-10-CM

## 2023-07-26 DIAGNOSIS — L97.521 DIABETIC ULCER OF LEFT FOOT ASSOCIATED WITH TYPE 2 DIABETES MELLITUS, LIMITED TO BREAKDOWN OF SKIN, UNSPECIFIED PART OF FOOT: Primary | ICD-10-CM

## 2023-07-26 DIAGNOSIS — E11.621 DIABETIC ULCER OF LEFT FOOT ASSOCIATED WITH TYPE 2 DIABETES MELLITUS, LIMITED TO BREAKDOWN OF SKIN, UNSPECIFIED PART OF FOOT: Primary | ICD-10-CM

## 2023-07-26 DIAGNOSIS — R00.0 TACHYCARDIA: ICD-10-CM

## 2023-07-26 DIAGNOSIS — L08.9 DIABETIC FOOT INFECTION: ICD-10-CM

## 2023-07-26 DIAGNOSIS — E11.621 DIABETIC FOOT ULCER ASSOCIATED WITH TYPE 2 DIABETES MELLITUS: ICD-10-CM

## 2023-07-26 DIAGNOSIS — I48.91 ATRIAL FIBRILLATION: ICD-10-CM

## 2023-07-26 DIAGNOSIS — E11.628 DIABETIC FOOT INFECTION: ICD-10-CM

## 2023-07-26 DIAGNOSIS — I48.91 ATRIAL FIBRILLATION WITH RAPID VENTRICULAR RESPONSE: ICD-10-CM

## 2023-07-26 DIAGNOSIS — L97.509 DIABETIC FOOT ULCER ASSOCIATED WITH TYPE 2 DIABETES MELLITUS: ICD-10-CM

## 2023-07-26 PROBLEM — R65.20 SEVERE SEPSIS: Status: ACTIVE | Noted: 2023-07-26

## 2023-07-26 PROBLEM — A41.9 SEVERE SEPSIS: Status: ACTIVE | Noted: 2023-07-26

## 2023-07-26 PROBLEM — Z78.9 ALCOHOL USE: Status: ACTIVE | Noted: 2023-07-26

## 2023-07-26 PROBLEM — E87.1 CHRONIC HYPONATREMIA: Status: ACTIVE | Noted: 2023-07-26

## 2023-07-26 LAB
ALBUMIN SERPL BCP-MCNC: 3.8 G/DL (ref 3.5–5.2)
ALP SERPL-CCNC: 74 U/L (ref 55–135)
ALT SERPL W/O P-5'-P-CCNC: 19 U/L (ref 10–44)
ANION GAP SERPL CALC-SCNC: 11 MMOL/L (ref 8–16)
ANION GAP SERPL CALC-SCNC: 12 MMOL/L (ref 8–16)
AST SERPL-CCNC: 24 U/L (ref 10–40)
BACTERIA #/AREA URNS HPF: ABNORMAL /HPF
BASOPHILS # BLD AUTO: 0.04 K/UL (ref 0–0.2)
BASOPHILS NFR BLD: 0.2 % (ref 0–1.9)
BILIRUB SERPL-MCNC: 1.7 MG/DL (ref 0.1–1)
BILIRUB UR QL STRIP: NEGATIVE
BUN SERPL-MCNC: 11 MG/DL (ref 8–23)
BUN SERPL-MCNC: 13 MG/DL (ref 8–23)
CALCIUM SERPL-MCNC: 8.7 MG/DL (ref 8.7–10.5)
CALCIUM SERPL-MCNC: 9.9 MG/DL (ref 8.7–10.5)
CHLORIDE SERPL-SCNC: 91 MMOL/L (ref 95–110)
CHLORIDE SERPL-SCNC: 96 MMOL/L (ref 95–110)
CHOLEST SERPL-MCNC: 144 MG/DL (ref 120–199)
CHOLEST/HDLC SERPL: 2.8 {RATIO} (ref 2–5)
CLARITY UR: ABNORMAL
CO2 SERPL-SCNC: 20 MMOL/L (ref 23–29)
CO2 SERPL-SCNC: 22 MMOL/L (ref 23–29)
COLOR UR: ABNORMAL
CREAT SERPL-MCNC: 0.9 MG/DL (ref 0.5–1.4)
CREAT SERPL-MCNC: 1.2 MG/DL (ref 0.5–1.4)
DIFFERENTIAL METHOD: ABNORMAL
EOSINOPHIL # BLD AUTO: 0.1 K/UL (ref 0–0.5)
EOSINOPHIL NFR BLD: 0.3 % (ref 0–8)
ERYTHROCYTE [DISTWIDTH] IN BLOOD BY AUTOMATED COUNT: 12.2 % (ref 11.5–14.5)
EST. GFR  (NO RACE VARIABLE): >60 ML/MIN/1.73 M^2
EST. GFR  (NO RACE VARIABLE): >60 ML/MIN/1.73 M^2
ESTIMATED AVG GLUCOSE: 217 MG/DL (ref 68–131)
GLUCOSE SERPL-MCNC: 242 MG/DL (ref 70–110)
GLUCOSE SERPL-MCNC: 258 MG/DL (ref 70–110)
GLUCOSE UR QL STRIP: ABNORMAL
HBA1C MFR BLD: 9.2 % (ref 4–5.6)
HCT VFR BLD AUTO: 39.7 % (ref 40–54)
HDLC SERPL-MCNC: 51 MG/DL (ref 40–75)
HDLC SERPL: 35.4 % (ref 20–50)
HGB BLD-MCNC: 13.7 G/DL (ref 14–18)
HGB UR QL STRIP: ABNORMAL
HYALINE CASTS #/AREA URNS LPF: 0 /LPF
IMM GRANULOCYTES # BLD AUTO: 0.08 K/UL (ref 0–0.04)
IMM GRANULOCYTES NFR BLD AUTO: 0.4 % (ref 0–0.5)
KETONES UR QL STRIP: ABNORMAL
LACTATE SERPL-SCNC: 1.5 MMOL/L (ref 0.5–2.2)
LACTATE SERPL-SCNC: 1.9 MMOL/L (ref 0.5–2.2)
LDLC SERPL CALC-MCNC: 80.8 MG/DL (ref 63–159)
LEUKOCYTE ESTERASE UR QL STRIP: NEGATIVE
LYMPHOCYTES # BLD AUTO: 1.8 K/UL (ref 1–4.8)
LYMPHOCYTES NFR BLD: 9.1 % (ref 18–48)
MAGNESIUM SERPL-MCNC: 2 MG/DL (ref 1.6–2.6)
MCH RBC QN AUTO: 31.4 PG (ref 27–31)
MCHC RBC AUTO-ENTMCNC: 34.5 G/DL (ref 32–36)
MCV RBC AUTO: 91 FL (ref 82–98)
MICROSCOPIC COMMENT: ABNORMAL
MONOCYTES # BLD AUTO: 1.4 K/UL (ref 0.3–1)
MONOCYTES NFR BLD: 7.1 % (ref 4–15)
NEUTROPHILS # BLD AUTO: 16.2 K/UL (ref 1.8–7.7)
NEUTROPHILS NFR BLD: 82.9 % (ref 38–73)
NITRITE UR QL STRIP: NEGATIVE
NONHDLC SERPL-MCNC: 93 MG/DL
NRBC BLD-RTO: 0 /100 WBC
PH UR STRIP: 6 [PH] (ref 5–8)
PLATELET # BLD AUTO: 263 K/UL (ref 150–450)
PMV BLD AUTO: 9.9 FL (ref 9.2–12.9)
POCT GLUCOSE: 248 MG/DL (ref 70–110)
POCT GLUCOSE: 266 MG/DL (ref 70–110)
POTASSIUM SERPL-SCNC: 4.2 MMOL/L (ref 3.5–5.1)
POTASSIUM SERPL-SCNC: 4.2 MMOL/L (ref 3.5–5.1)
PROT SERPL-MCNC: 9.1 G/DL (ref 6–8.4)
PROT UR QL STRIP: ABNORMAL
RBC # BLD AUTO: 4.37 M/UL (ref 4.6–6.2)
RBC #/AREA URNS HPF: 10 /HPF (ref 0–4)
SODIUM SERPL-SCNC: 125 MMOL/L (ref 136–145)
SODIUM SERPL-SCNC: 127 MMOL/L (ref 136–145)
SP GR UR STRIP: 1.01 (ref 1–1.03)
TRIGL SERPL-MCNC: 61 MG/DL (ref 30–150)
TROPONIN I SERPL DL<=0.01 NG/ML-MCNC: <0.006 NG/ML (ref 0–0.03)
TSH SERPL DL<=0.005 MIU/L-ACNC: 3.56 UIU/ML (ref 0.4–4)
URN SPEC COLLECT METH UR: ABNORMAL
UROBILINOGEN UR STRIP-ACNC: ABNORMAL EU/DL
WBC # BLD AUTO: 19.53 K/UL (ref 3.9–12.7)
WBC #/AREA URNS HPF: 0 /HPF (ref 0–5)

## 2023-07-26 PROCEDURE — 63600175 PHARM REV CODE 636 W HCPCS: Performed by: STUDENT IN AN ORGANIZED HEALTH CARE EDUCATION/TRAINING PROGRAM

## 2023-07-26 PROCEDURE — 87070 CULTURE OTHR SPECIMN AEROBIC: CPT | Performed by: STUDENT IN AN ORGANIZED HEALTH CARE EDUCATION/TRAINING PROGRAM

## 2023-07-26 PROCEDURE — 25000003 PHARM REV CODE 250: Performed by: STUDENT IN AN ORGANIZED HEALTH CARE EDUCATION/TRAINING PROGRAM

## 2023-07-26 PROCEDURE — 84443 ASSAY THYROID STIM HORMONE: CPT | Performed by: EMERGENCY MEDICINE

## 2023-07-26 PROCEDURE — 82962 GLUCOSE BLOOD TEST: CPT

## 2023-07-26 PROCEDURE — 83036 HEMOGLOBIN GLYCOSYLATED A1C: CPT | Performed by: EMERGENCY MEDICINE

## 2023-07-26 PROCEDURE — 83930 ASSAY OF BLOOD OSMOLALITY: CPT | Performed by: STUDENT IN AN ORGANIZED HEALTH CARE EDUCATION/TRAINING PROGRAM

## 2023-07-26 PROCEDURE — 71045 X-RAY EXAM CHEST 1 VIEW: CPT | Mod: 26,,, | Performed by: RADIOLOGY

## 2023-07-26 PROCEDURE — 85025 COMPLETE CBC W/AUTO DIFF WBC: CPT | Performed by: EMERGENCY MEDICINE

## 2023-07-26 PROCEDURE — 87389 HIV-1 AG W/HIV-1&-2 AB AG IA: CPT | Performed by: EMERGENCY MEDICINE

## 2023-07-26 PROCEDURE — 99223 PR INITIAL HOSPITAL CARE,LEVL III: ICD-10-PCS | Mod: ,,, | Performed by: STUDENT IN AN ORGANIZED HEALTH CARE EDUCATION/TRAINING PROGRAM

## 2023-07-26 PROCEDURE — 80053 COMPREHEN METABOLIC PANEL: CPT | Performed by: EMERGENCY MEDICINE

## 2023-07-26 PROCEDURE — 87186 SC STD MICRODIL/AGAR DIL: CPT | Mod: 59 | Performed by: STUDENT IN AN ORGANIZED HEALTH CARE EDUCATION/TRAINING PROGRAM

## 2023-07-26 PROCEDURE — 99223 1ST HOSP IP/OBS HIGH 75: CPT | Mod: ,,, | Performed by: STUDENT IN AN ORGANIZED HEALTH CARE EDUCATION/TRAINING PROGRAM

## 2023-07-26 PROCEDURE — 71045 XR CHEST AP PORTABLE: ICD-10-PCS | Mod: 26,,, | Performed by: RADIOLOGY

## 2023-07-26 PROCEDURE — 87076 CULTURE ANAEROBE IDENT EACH: CPT | Mod: 59 | Performed by: STUDENT IN AN ORGANIZED HEALTH CARE EDUCATION/TRAINING PROGRAM

## 2023-07-26 PROCEDURE — 73630 X-RAY EXAM OF FOOT: CPT | Mod: TC,LT

## 2023-07-26 PROCEDURE — 83735 ASSAY OF MAGNESIUM: CPT | Performed by: EMERGENCY MEDICINE

## 2023-07-26 PROCEDURE — 63600175 PHARM REV CODE 636 W HCPCS

## 2023-07-26 PROCEDURE — 83935 ASSAY OF URINE OSMOLALITY: CPT | Performed by: STUDENT IN AN ORGANIZED HEALTH CARE EDUCATION/TRAINING PROGRAM

## 2023-07-26 PROCEDURE — 83605 ASSAY OF LACTIC ACID: CPT | Mod: 91 | Performed by: EMERGENCY MEDICINE

## 2023-07-26 PROCEDURE — 84300 ASSAY OF URINE SODIUM: CPT | Performed by: STUDENT IN AN ORGANIZED HEALTH CARE EDUCATION/TRAINING PROGRAM

## 2023-07-26 PROCEDURE — 96374 THER/PROPH/DIAG INJ IV PUSH: CPT

## 2023-07-26 PROCEDURE — 73630 XR FOOT COMPLETE 3 VIEW LEFT: ICD-10-PCS | Mod: 26,LT,, | Performed by: RADIOLOGY

## 2023-07-26 PROCEDURE — 73630 X-RAY EXAM OF FOOT: CPT | Mod: 26,LT,, | Performed by: RADIOLOGY

## 2023-07-26 PROCEDURE — 84484 ASSAY OF TROPONIN QUANT: CPT | Performed by: EMERGENCY MEDICINE

## 2023-07-26 PROCEDURE — 86803 HEPATITIS C AB TEST: CPT | Performed by: EMERGENCY MEDICINE

## 2023-07-26 PROCEDURE — 99285 EMERGENCY DEPT VISIT HI MDM: CPT | Mod: 25

## 2023-07-26 PROCEDURE — 36415 COLL VENOUS BLD VENIPUNCTURE: CPT | Performed by: EMERGENCY MEDICINE

## 2023-07-26 PROCEDURE — 87040 BLOOD CULTURE FOR BACTERIA: CPT | Performed by: EMERGENCY MEDICINE

## 2023-07-26 PROCEDURE — 21400001 HC TELEMETRY ROOM

## 2023-07-26 PROCEDURE — 80048 BASIC METABOLIC PNL TOTAL CA: CPT | Mod: XB | Performed by: STUDENT IN AN ORGANIZED HEALTH CARE EDUCATION/TRAINING PROGRAM

## 2023-07-26 PROCEDURE — 63600175 PHARM REV CODE 636 W HCPCS: Performed by: EMERGENCY MEDICINE

## 2023-07-26 PROCEDURE — 87077 CULTURE AEROBIC IDENTIFY: CPT | Performed by: STUDENT IN AN ORGANIZED HEALTH CARE EDUCATION/TRAINING PROGRAM

## 2023-07-26 PROCEDURE — 87075 CULTR BACTERIA EXCEPT BLOOD: CPT | Performed by: STUDENT IN AN ORGANIZED HEALTH CARE EDUCATION/TRAINING PROGRAM

## 2023-07-26 PROCEDURE — 71045 X-RAY EXAM CHEST 1 VIEW: CPT | Mod: TC

## 2023-07-26 PROCEDURE — 25000003 PHARM REV CODE 250: Performed by: EMERGENCY MEDICINE

## 2023-07-26 PROCEDURE — 81000 URINALYSIS NONAUTO W/SCOPE: CPT | Performed by: EMERGENCY MEDICINE

## 2023-07-26 PROCEDURE — 80061 LIPID PANEL: CPT | Performed by: EMERGENCY MEDICINE

## 2023-07-26 RX ORDER — GLUCAGON 1 MG
1 KIT INJECTION
Status: DISCONTINUED | OUTPATIENT
Start: 2023-07-26 | End: 2023-07-31 | Stop reason: HOSPADM

## 2023-07-26 RX ORDER — ONDANSETRON 2 MG/ML
4 INJECTION INTRAMUSCULAR; INTRAVENOUS
Status: COMPLETED | OUTPATIENT
Start: 2023-07-26 | End: 2023-07-26

## 2023-07-26 RX ORDER — IBUPROFEN 200 MG
16 TABLET ORAL
Status: DISCONTINUED | OUTPATIENT
Start: 2023-07-26 | End: 2023-07-31 | Stop reason: HOSPADM

## 2023-07-26 RX ORDER — MORPHINE SULFATE 2 MG/ML
1 INJECTION, SOLUTION INTRAMUSCULAR; INTRAVENOUS EVERY 4 HOURS PRN
Status: DISCONTINUED | OUTPATIENT
Start: 2023-07-26 | End: 2023-07-31 | Stop reason: HOSPADM

## 2023-07-26 RX ORDER — FOLIC ACID 1 MG/1
1 TABLET ORAL DAILY
Status: DISCONTINUED | OUTPATIENT
Start: 2023-07-27 | End: 2023-07-31 | Stop reason: HOSPADM

## 2023-07-26 RX ORDER — HYDROMORPHONE HYDROCHLORIDE 1 MG/ML
0.5 INJECTION, SOLUTION INTRAMUSCULAR; INTRAVENOUS; SUBCUTANEOUS
Status: DISCONTINUED | OUTPATIENT
Start: 2023-07-26 | End: 2023-07-26

## 2023-07-26 RX ORDER — INSULIN ASPART 100 [IU]/ML
0-5 INJECTION, SOLUTION INTRAVENOUS; SUBCUTANEOUS
Status: DISCONTINUED | OUTPATIENT
Start: 2023-07-26 | End: 2023-07-31 | Stop reason: HOSPADM

## 2023-07-26 RX ORDER — THIAMINE HCL 100 MG
100 TABLET ORAL DAILY
Status: DISCONTINUED | OUTPATIENT
Start: 2023-07-27 | End: 2023-07-31 | Stop reason: HOSPADM

## 2023-07-26 RX ORDER — ACETAMINOPHEN 325 MG/1
650 TABLET ORAL EVERY 4 HOURS PRN
Status: DISCONTINUED | OUTPATIENT
Start: 2023-07-26 | End: 2023-07-31 | Stop reason: HOSPADM

## 2023-07-26 RX ORDER — OXYCODONE HYDROCHLORIDE 5 MG/1
5 TABLET ORAL EVERY 6 HOURS PRN
Status: DISCONTINUED | OUTPATIENT
Start: 2023-07-26 | End: 2023-07-26

## 2023-07-26 RX ORDER — SODIUM CHLORIDE, SODIUM LACTATE, POTASSIUM CHLORIDE, CALCIUM CHLORIDE 600; 310; 30; 20 MG/100ML; MG/100ML; MG/100ML; MG/100ML
INJECTION, SOLUTION INTRAVENOUS CONTINUOUS
Status: DISCONTINUED | OUTPATIENT
Start: 2023-07-26 | End: 2023-07-27

## 2023-07-26 RX ORDER — METOPROLOL TARTRATE 1 MG/ML
5 INJECTION, SOLUTION INTRAVENOUS ONCE
Status: COMPLETED | OUTPATIENT
Start: 2023-07-26 | End: 2023-07-26

## 2023-07-26 RX ORDER — METOPROLOL TARTRATE 25 MG/1
25 TABLET, FILM COATED ORAL 2 TIMES DAILY
Status: DISCONTINUED | OUTPATIENT
Start: 2023-07-26 | End: 2023-07-31 | Stop reason: HOSPADM

## 2023-07-26 RX ORDER — VANCOMYCIN 2 GRAM/500 ML IN 0.9 % SODIUM CHLORIDE INTRAVENOUS
2000
Status: COMPLETED | OUTPATIENT
Start: 2023-07-26 | End: 2023-07-26

## 2023-07-26 RX ORDER — MORPHINE SULFATE 2 MG/ML
1 INJECTION, SOLUTION INTRAMUSCULAR; INTRAVENOUS EVERY 4 HOURS PRN
Status: DISCONTINUED | OUTPATIENT
Start: 2023-07-26 | End: 2023-07-26

## 2023-07-26 RX ORDER — ONDANSETRON 4 MG/1
8 TABLET, ORALLY DISINTEGRATING ORAL EVERY 8 HOURS PRN
Status: DISCONTINUED | OUTPATIENT
Start: 2023-07-26 | End: 2023-07-31 | Stop reason: HOSPADM

## 2023-07-26 RX ORDER — PROCHLORPERAZINE EDISYLATE 5 MG/ML
5 INJECTION INTRAMUSCULAR; INTRAVENOUS EVERY 6 HOURS PRN
Status: DISCONTINUED | OUTPATIENT
Start: 2023-07-26 | End: 2023-07-31 | Stop reason: HOSPADM

## 2023-07-26 RX ORDER — OXYCODONE HYDROCHLORIDE 5 MG/1
5 TABLET ORAL EVERY 6 HOURS PRN
Status: DISCONTINUED | OUTPATIENT
Start: 2023-07-26 | End: 2023-07-31 | Stop reason: HOSPADM

## 2023-07-26 RX ORDER — IBUPROFEN 200 MG
24 TABLET ORAL
Status: DISCONTINUED | OUTPATIENT
Start: 2023-07-26 | End: 2023-07-31 | Stop reason: HOSPADM

## 2023-07-26 RX ORDER — ENOXAPARIN SODIUM 100 MG/ML
INJECTION SUBCUTANEOUS
Status: COMPLETED
Start: 2023-07-26 | End: 2023-07-26

## 2023-07-26 RX ORDER — METOPROLOL TARTRATE 1 MG/ML
5 INJECTION, SOLUTION INTRAVENOUS ONCE
Status: DISCONTINUED | OUTPATIENT
Start: 2023-07-26 | End: 2023-07-26

## 2023-07-26 RX ORDER — SODIUM CHLORIDE 0.9 % (FLUSH) 0.9 %
10 SYRINGE (ML) INJECTION EVERY 12 HOURS PRN
Status: DISCONTINUED | OUTPATIENT
Start: 2023-07-26 | End: 2023-07-31 | Stop reason: HOSPADM

## 2023-07-26 RX ORDER — NALOXONE HCL 0.4 MG/ML
0.02 VIAL (ML) INJECTION
Status: DISCONTINUED | OUTPATIENT
Start: 2023-07-26 | End: 2023-07-31 | Stop reason: HOSPADM

## 2023-07-26 RX ORDER — ENOXAPARIN SODIUM 150 MG/ML
1 INJECTION SUBCUTANEOUS EVERY 12 HOURS
Status: DISCONTINUED | OUTPATIENT
Start: 2023-07-26 | End: 2023-07-31

## 2023-07-26 RX ADMIN — ENOXAPARIN SODIUM 120 MG: 60 INJECTION SUBCUTANEOUS at 08:07

## 2023-07-26 RX ADMIN — METOPROLOL TARTRATE 5 MG: 1 INJECTION, SOLUTION INTRAVENOUS at 06:07

## 2023-07-26 RX ADMIN — OXYCODONE HYDROCHLORIDE 5 MG: 5 TABLET ORAL at 06:07

## 2023-07-26 RX ADMIN — ONDANSETRON HYDROCHLORIDE 4 MG: 2 SOLUTION INTRAMUSCULAR; INTRAVENOUS at 06:07

## 2023-07-26 RX ADMIN — VANCOMYCIN HYDROCHLORIDE 2000 MG: 1 INJECTION, POWDER, LYOPHILIZED, FOR SOLUTION INTRAVENOUS at 05:07

## 2023-07-26 RX ADMIN — SODIUM CHLORIDE 1000 ML: 9 INJECTION, SOLUTION INTRAVENOUS at 05:07

## 2023-07-26 RX ADMIN — PIPERACILLIN AND TAZOBACTAM 4.5 G: 4; .5 INJECTION, POWDER, LYOPHILIZED, FOR SOLUTION INTRAVENOUS; PARENTERAL at 07:07

## 2023-07-26 RX ADMIN — INSULIN ASPART 1 UNITS: 100 INJECTION, SOLUTION INTRAVENOUS; SUBCUTANEOUS at 08:07

## 2023-07-26 RX ADMIN — METOPROLOL TARTRATE 25 MG: 25 TABLET, FILM COATED ORAL at 06:07

## 2023-07-26 RX ADMIN — INSULIN DETEMIR 5 UNITS: 100 INJECTION, SOLUTION SUBCUTANEOUS at 08:07

## 2023-07-26 RX ADMIN — SODIUM CHLORIDE, POTASSIUM CHLORIDE, SODIUM LACTATE AND CALCIUM CHLORIDE: 600; 310; 30; 20 INJECTION, SOLUTION INTRAVENOUS at 07:07

## 2023-07-26 NOTE — ED PROVIDER NOTES
Encounter Date: 7/26/2023       History     Chief Complaint   Patient presents with    Leg Swelling     LLE pain and swelling with wound to bottom of L foot x 1 month     Well-developed 73-year-old male comes emergency room with complaints of swelling in his left foot.  Pain on left foot.  The patient has an obvious diabetic foot wound on the left distal ball of his foot behind the great toe.  There is obvious pus within this.  There is no drainage however.  Very painful.  Red and angry.  2+ dorsalis pedis pulse noted.  Patient states he was cleaning a pool when he scraped his foot on the bottom of the pool.  Developed a blister.  The blister developed into this.  The patient has been seen at this hospital before with a serious injury.  It was a diabetic ulcer at that time as well.  The patient AMAed.      Review of patient's allergies indicates:  No Known Allergies  Past Medical History:   Diagnosis Date    A-fib     Diabetes mellitus, type 2     Hypertension     Primary osteoarthritis of left knee 9/19/2022     Past Surgical History:   Procedure Laterality Date    JOINT REPLACEMENT  2013    Knee    KNEE ARTHROSCOPY      TOTAL KNEE ARTHROPLASTY Left 9/19/2022    Procedure: LEFT ARTHROPLASTY, KNEE, TOTAL;  Surgeon: Ludwin Palacio DO;  Location: [facility] OR;  Service: Orthopedics;  Laterality: Left;     Family History   Problem Relation Age of Onset    Diabetes Mother      Social History     Tobacco Use    Smoking status: Never    Smokeless tobacco: Never   Substance Use Topics    Alcohol use: Yes    Drug use: Never     Review of Systems   Constitutional:  Negative for appetite change and fever.   HENT:  Negative for sore throat.    Respiratory:  Negative for shortness of breath.    Cardiovascular:  Negative for chest pain.   Gastrointestinal:  Negative for nausea.   Genitourinary:  Negative for dysuria.   Musculoskeletal:  Positive for gait problem (Diabetic foot ulcer left foot ball of foot proximal great toe.).  Negative for back pain.   Skin:  Negative for rash.   Neurological:  Negative for weakness.   Hematological:  Does not bruise/bleed easily.   All other systems reviewed and are negative.      Physical Exam     Initial Vitals   BP Pulse Resp Temp SpO2   07/26/23 1611 07/26/23 1607 07/26/23 1607 07/26/23 1607 07/26/23 1607   (!) 137/103 (!) 140 20 98 °F (36.7 °C) 98 %      MAP       --                Physical Exam    Nursing note and vitals reviewed.  Constitutional: He appears well-developed and well-nourished.   HENT:   Head: Normocephalic and atraumatic.   Eyes: EOM are normal. Pupils are equal, round, and reactive to light. No scleral icterus.   Neck: Neck supple.   Normal range of motion.  Cardiovascular:  Normal rate, regular rhythm and normal heart sounds.           No murmur heard.  Pulmonary/Chest: Breath sounds normal. He has no wheezes. He has no rales. He exhibits no tenderness.   Abdominal: Abdomen is soft. Bowel sounds are normal.   Musculoskeletal:         General: Normal range of motion.      Cervical back: Normal range of motion and neck supple.      Comments: Diabetic foot ulcer on the left foot ball of foot proximal to great toe.  Neurovascularly intact although decreased sensation secondary to diabetic neuropathy.  2+ dorsalis pedis pulse.  Cellulitis noticed up to the mid calf region.     Neurological: He is alert and oriented to person, place, and time. He has normal strength. GCS score is 15. GCS eye subscore is 4. GCS verbal subscore is 5. GCS motor subscore is 6.   Skin: Skin is warm and dry.   Psychiatric: He has a normal mood and affect. His behavior is normal. Judgment and thought content normal.         ED Course   Procedures  Labs Reviewed   CBC W/ AUTO DIFFERENTIAL - Abnormal; Notable for the following components:       Result Value    WBC 19.53 (*)     RBC 4.37 (*)     Hemoglobin 13.7 (*)     Hematocrit 39.7 (*)     MCH 31.4 (*)     Gran # (ANC) 16.2 (*)     Immature Grans (Abs) 0.08  (*)     Mono # 1.4 (*)     Gran % 82.9 (*)     Lymph % 9.1 (*)     All other components within normal limits   COMPREHENSIVE METABOLIC PANEL - Abnormal; Notable for the following components:    Sodium 125 (*)     Chloride 91 (*)     CO2 22 (*)     Glucose 258 (*)     Total Protein 9.1 (*)     Total Bilirubin 1.7 (*)     All other components within normal limits   URINALYSIS, REFLEX TO URINE CULTURE - Abnormal; Notable for the following components:    Protein, UA 1+ (*)     Glucose, UA 2+ (*)     Ketones, UA 3+ (*)     Occult Blood UA 1+ (*)     Urobilinogen, UA 2.0-3.0 (*)     All other components within normal limits    Narrative:     Preferred Collection Type->Urine, Clean Catch  Specimen Source->Urine   HEMOGLOBIN A1C - Abnormal; Notable for the following components:    Hemoglobin A1C 9.2 (*)     Estimated Avg Glucose 217 (*)     All other components within normal limits   URINALYSIS MICROSCOPIC - Abnormal; Notable for the following components:    RBC, UA 10 (*)     All other components within normal limits    Narrative:     Preferred Collection Type->Urine, Clean Catch  Specimen Source->Urine   POCT GLUCOSE - Abnormal; Notable for the following components:    POCT Glucose 266 (*)     All other components within normal limits   LACTIC ACID, PLASMA   MAGNESIUM   TROPONIN I   HEMOGLOBIN A1C   LIPID PANEL   TSH   LIPID PANEL   TSH   OSMOLALITY, SERUM          Imaging Results              X-Ray Foot Complete Left (Final result)  Result time 07/26/23 17:01:02      Final result by Marion Heredia MD (07/26/23 17:01:02)                   Impression:      Extensive abnormalities throughout the midfoot, most likely on the basis of neuropathic joint formation in this diabetic patient.      Electronically signed by: Marion Heredia  Date:    07/26/2023  Time:    17:01               Narrative:    EXAMINATION:  XR FOOT COMPLETE 3 VIEW LEFT    CLINICAL HISTORY:  .  Type 2 diabetes mellitus with foot  "ulcer    TECHNIQUE:  AP, lateral and oblique views of the left foot were performed.    COMPARISON:  None    FINDINGS:  Extensive joint space narrowing subchondral cystic and destructive changes are present throughout the midfoot, likely on the basis of neuropathic joint formation.  No fracture or definite evidence of osteomyelitis identified.  There is soft tissue swelling throughout the foot.                                       X-Ray Chest AP Portable (Final result)  Result time 07/26/23 16:59:12      Final result by Shai Mendez MD (07/26/23 16:59:12)                   Impression:      No acute process.      Electronically signed by: Shai Mendez MD  Date:    07/26/2023  Time:    16:59               Narrative:    EXAMINATION:  XR CHEST AP PORTABLE    CLINICAL HISTORY:  Sepsis;    TECHNIQUE:  Single frontal view of the chest was performed.    COMPARISON:  None    FINDINGS:  The cardiomediastinal silhouette is with normal limits.  Lung volumes are moderately low.  There is no consolidation, edema, or pleural effusion.                                       Medications   sodium chloride 0.9% bolus 1,000 mL 1,000 mL (0 mLs Intravenous Stopped 7/26/23 1809)   vancomycin 2 g in 0.9% sodium chloride 500 mL IVPB (0 mg Intravenous Stopped 7/26/23 1933)   metoprolol injection 5 mg (5 mg Intravenous Given 7/26/23 1805)   ondansetron injection 4 mg (4 mg Intravenous Given 7/26/23 1803)   enoxaparin (LOVENOX) 60 mg/0.6 mL injection (120 mg  Given 7/26/23 2047)                 Sepsis Perfusion Assessment: "I attest a sepsis perfusion exam was performed within 6 hours of sepsis, severe sepsis, or septic shock presentation, following fluid resuscitation."            Clinical Impression:   Final diagnoses:  [R00.0] Tachycardia  [I48.91] Atrial fibrillation  [E11.621, L97.509] Diabetic foot ulcer associated with type 2 diabetes mellitus  [E11.621, L97.521] Diabetic ulcer of left foot associated with type 2 diabetes " mellitus, limited to breakdown of skin, unspecified part of foot (Primary)  [I48.91] Atrial fibrillation with rapid ventricular response  [E11.628, L08.9] Diabetic foot infection        ED Disposition Condition    Admit                 David Tran MD  08/09/23 0754

## 2023-07-26 NOTE — HPI
Patient with a hx of NIDDM2, Afib not on AC, Etoh Abuse presenting with left foot pain. 2 months ago patient was cleaning public pool with a scrubber and scrapped left foot on the pool floor. Blister developed after that. This popped. Wound did not heal and worsened over two months. He has noticed increased swelling, redness, pain in foot over past 2-3 weeks. Endorses some fever and chills. No NVD. Patient states he drinks 2 shots per day and has done this for a long time.

## 2023-07-26 NOTE — SUBJECTIVE & OBJECTIVE
Past Medical History:   Diagnosis Date    A-fib     Diabetes mellitus, type 2     Hypertension     Primary osteoarthritis of left knee 9/19/2022       Past Surgical History:   Procedure Laterality Date    JOINT REPLACEMENT  2013    Knee    KNEE ARTHROSCOPY      TOTAL KNEE ARTHROPLASTY Left 9/19/2022    Procedure: LEFT ARTHROPLASTY, KNEE, TOTAL;  Surgeon: Ludwin Palacio DO;  Location: [facility] OR;  Service: Orthopedics;  Laterality: Left;       Review of patient's allergies indicates:  No Known Allergies    No current facility-administered medications on file prior to encounter.     Current Outpatient Medications on File Prior to Encounter   Medication Sig    metFORMIN (GLUCOPHAGE) 1000 MG tablet 500 mg.    metoprolol succinate (TOPROL-XL) 25 MG 24 hr tablet Take 25 mg by mouth once daily.    sildenafiL (VIAGRA) 100 MG tablet 100 mg.     Family History       Problem Relation (Age of Onset)    Diabetes Mother          Tobacco Use    Smoking status: Never    Smokeless tobacco: Never   Substance and Sexual Activity    Alcohol use: Yes    Drug use: Never    Sexual activity: Not on file     Review of Systems   All other systems reviewed and are negative.  Objective:     Vital Signs (Most Recent):  Temp: 98 °F (36.7 °C) (07/26/23 1607)  Pulse: 109 (07/26/23 1818)  Resp: 16 (07/26/23 1828)  BP: 112/84 (07/26/23 1818)  SpO2: (!) 91 % (07/26/23 1818) Vital Signs (24h Range):  Temp:  [98 °F (36.7 °C)] 98 °F (36.7 °C)  Pulse:  [109-140] 109  Resp:  [14-31] 16  SpO2:  [91 %-98 %] 91 %  BP: (112-139)/() 112/84     Weight: 113.4 kg (250 lb)  Body mass index is 32.98 kg/m².     Physical Exam      Vitals reviewed  General: NAD, Well developed, Well Nourished  Head: NC/AT  Eyes: EOMI, GUTIERREZ  Cardiovascular: Pulses intact distally, Irregular rate and rhythm  Pulmonary: Normal Respiratory Rate, No respiratory distress  Gi: Soft, Non-tender  Extremities: Warm, RLE without edema, LLE with edema below knee. Cellulitis extending from  foot up to middle of lower leg. Right medial plantar surface of left foot near first metatarsal with drainage from wound with another area of likely abscess forming medial to that. DP pulse present. Sensation decreased to wound per nursing.  Skin: Warm, dry  Neuro: Alert, Oriented x3, No focal Deficit  Psych: Appropriate mood and affect       Significant Labs: All pertinent labs within the past 24 hours have been reviewed.    Significant Imaging: I have reviewed all pertinent imaging results/findings within the past 24 hours.

## 2023-07-26 NOTE — ED NOTES
Pt provided with urinal for UA, but urine spilled onto the floor and did not make it into the urinal. Pt denies needs or complaint at this time.

## 2023-07-26 NOTE — ASSESSMENT & PLAN NOTE
Patient's FSGs are uncontrolled due to hyperglycemia on current medication regimen.  Last A1c reviewed-   Lab Results   Component Value Date    HGBA1C 9.2 (H) 07/26/2023     Most recent fingerstick glucose reviewed-   Recent Labs   Lab 07/26/23  1612   POCTGLUCOSE 266*     Current correctional scale  Low  Maintain anti-hyperglycemic dose as follows-   Antihyperglycemics (From admission, onward)    Start     Stop Route Frequency Ordered    07/26/23 2100  insulin detemir U-100 (Levemir) pen 5 Units         -- SubQ Nightly 07/26/23 1858    07/26/23 1842  insulin aspart U-100 pen 0-5 Units         -- SubQ Before meals & nightly PRN 07/26/23 1743        Hold Oral hypoglycemics while patient is in the hospital.

## 2023-07-26 NOTE — ASSESSMENT & PLAN NOTE
Persistent Afib w/ RVR on rate control with metoprolol at home  Giving 1 dose IV lopressor and starting metoprolol tartrate 25mg BID- Uptitrate as needed.  Starting therapeutic lovenox for stroke reduction- discussed risks/benefits with patient and they consent to AC

## 2023-07-26 NOTE — TELEPHONE ENCOUNTER
Patient states his foot is red and hurting with no injury. Patient advised he may need to go to ED since no appt is available and patient will have area addressed sooner. Patient has first available appt and added to wait list.

## 2023-07-27 PROBLEM — M71.072 ABSCESS OF BURSA OF LEFT FOOT: Status: ACTIVE | Noted: 2023-07-27

## 2023-07-27 PROBLEM — L97.525 ULCER OF LEFT FOOT WITH MUSCLE INVOLVEMENT WITHOUT EVIDENCE OF NECROSIS: Status: ACTIVE | Noted: 2023-07-27

## 2023-07-27 LAB
ALBUMIN SERPL BCP-MCNC: 2.7 G/DL (ref 3.5–5.2)
ALP SERPL-CCNC: 56 U/L (ref 55–135)
ALT SERPL W/O P-5'-P-CCNC: 31 U/L (ref 10–44)
ANION GAP SERPL CALC-SCNC: 9 MMOL/L (ref 8–16)
AST SERPL-CCNC: 33 U/L (ref 10–40)
BASOPHILS # BLD AUTO: 0.06 K/UL (ref 0–0.2)
BASOPHILS NFR BLD: 0.4 % (ref 0–1.9)
BILIRUB SERPL-MCNC: 0.9 MG/DL (ref 0.1–1)
BUN SERPL-MCNC: 10 MG/DL (ref 8–23)
CALCIUM SERPL-MCNC: 8.6 MG/DL (ref 8.7–10.5)
CHLORIDE SERPL-SCNC: 103 MMOL/L (ref 95–110)
CO2 SERPL-SCNC: 18 MMOL/L (ref 23–29)
CREAT SERPL-MCNC: 0.9 MG/DL (ref 0.5–1.4)
DIFFERENTIAL METHOD: ABNORMAL
EOSINOPHIL # BLD AUTO: 0.3 K/UL (ref 0–0.5)
EOSINOPHIL NFR BLD: 1.9 % (ref 0–8)
ERYTHROCYTE [DISTWIDTH] IN BLOOD BY AUTOMATED COUNT: 12.4 % (ref 11.5–14.5)
EST. GFR  (NO RACE VARIABLE): >60 ML/MIN/1.73 M^2
GLUCOSE SERPL-MCNC: 205 MG/DL (ref 70–110)
HCT VFR BLD AUTO: 33.5 % (ref 40–54)
HCV AB SERPL QL IA: NORMAL
HGB BLD-MCNC: 11.6 G/DL (ref 14–18)
HIV 1+2 AB+HIV1 P24 AG SERPL QL IA: NORMAL
IMM GRANULOCYTES # BLD AUTO: 0.06 K/UL (ref 0–0.04)
IMM GRANULOCYTES NFR BLD AUTO: 0.4 % (ref 0–0.5)
LYMPHOCYTES # BLD AUTO: 2.3 K/UL (ref 1–4.8)
LYMPHOCYTES NFR BLD: 17.1 % (ref 18–48)
MAGNESIUM SERPL-MCNC: 1.9 MG/DL (ref 1.6–2.6)
MCH RBC QN AUTO: 31.4 PG (ref 27–31)
MCHC RBC AUTO-ENTMCNC: 34.6 G/DL (ref 32–36)
MCV RBC AUTO: 91 FL (ref 82–98)
MONOCYTES # BLD AUTO: 1.1 K/UL (ref 0.3–1)
MONOCYTES NFR BLD: 8.4 % (ref 4–15)
NEUTROPHILS # BLD AUTO: 9.6 K/UL (ref 1.8–7.7)
NEUTROPHILS NFR BLD: 71.8 % (ref 38–73)
NRBC BLD-RTO: 0 /100 WBC
OSMOLALITY SERPL: 285 MOSM/KG (ref 280–300)
OSMOLALITY UR: 445 MOSM/KG (ref 50–1200)
PHOSPHATE SERPL-MCNC: 2.8 MG/DL (ref 2.7–4.5)
PLATELET # BLD AUTO: 198 K/UL (ref 150–450)
PMV BLD AUTO: 10 FL (ref 9.2–12.9)
POCT GLUCOSE: 209 MG/DL (ref 70–110)
POCT GLUCOSE: 216 MG/DL (ref 70–110)
POCT GLUCOSE: 225 MG/DL (ref 70–110)
POCT GLUCOSE: 256 MG/DL (ref 70–110)
POCT GLUCOSE: 280 MG/DL (ref 70–110)
POTASSIUM SERPL-SCNC: 4.3 MMOL/L (ref 3.5–5.1)
PROT SERPL-MCNC: 6.6 G/DL (ref 6–8.4)
RBC # BLD AUTO: 3.69 M/UL (ref 4.6–6.2)
SODIUM SERPL-SCNC: 130 MMOL/L (ref 136–145)
SODIUM UR-SCNC: 30 MMOL/L (ref 20–250)
WBC # BLD AUTO: 13.36 K/UL (ref 3.9–12.7)

## 2023-07-27 PROCEDURE — 80053 COMPREHEN METABOLIC PANEL: CPT | Performed by: STUDENT IN AN ORGANIZED HEALTH CARE EDUCATION/TRAINING PROGRAM

## 2023-07-27 PROCEDURE — 85025 COMPLETE CBC W/AUTO DIFF WBC: CPT | Performed by: STUDENT IN AN ORGANIZED HEALTH CARE EDUCATION/TRAINING PROGRAM

## 2023-07-27 PROCEDURE — 63600175 PHARM REV CODE 636 W HCPCS: Performed by: STUDENT IN AN ORGANIZED HEALTH CARE EDUCATION/TRAINING PROGRAM

## 2023-07-27 PROCEDURE — 97597 PR DEBRIDEMENT OPEN WOUND 20 SQ CM<: ICD-10-PCS | Mod: ,,, | Performed by: PODIATRIST

## 2023-07-27 PROCEDURE — 25000003 PHARM REV CODE 250: Performed by: STUDENT IN AN ORGANIZED HEALTH CARE EDUCATION/TRAINING PROGRAM

## 2023-07-27 PROCEDURE — 99233 SBSQ HOSP IP/OBS HIGH 50: CPT | Mod: ,,, | Performed by: STUDENT IN AN ORGANIZED HEALTH CARE EDUCATION/TRAINING PROGRAM

## 2023-07-27 PROCEDURE — 99233 PR SUBSEQUENT HOSPITAL CARE,LEVL III: ICD-10-PCS | Mod: ,,, | Performed by: STUDENT IN AN ORGANIZED HEALTH CARE EDUCATION/TRAINING PROGRAM

## 2023-07-27 PROCEDURE — 99223 1ST HOSP IP/OBS HIGH 75: CPT | Mod: 25,,, | Performed by: PODIATRIST

## 2023-07-27 PROCEDURE — 99223 PR INITIAL HOSPITAL CARE,LEVL III: ICD-10-PCS | Mod: 25,,, | Performed by: PODIATRIST

## 2023-07-27 PROCEDURE — 83735 ASSAY OF MAGNESIUM: CPT | Performed by: STUDENT IN AN ORGANIZED HEALTH CARE EDUCATION/TRAINING PROGRAM

## 2023-07-27 PROCEDURE — 84100 ASSAY OF PHOSPHORUS: CPT | Performed by: STUDENT IN AN ORGANIZED HEALTH CARE EDUCATION/TRAINING PROGRAM

## 2023-07-27 PROCEDURE — 94761 N-INVAS EAR/PLS OXIMETRY MLT: CPT

## 2023-07-27 PROCEDURE — 97597 DBRDMT OPN WND 1ST 20 CM/<: CPT | Mod: ,,, | Performed by: PODIATRIST

## 2023-07-27 PROCEDURE — 21400001 HC TELEMETRY ROOM

## 2023-07-27 PROCEDURE — 36415 COLL VENOUS BLD VENIPUNCTURE: CPT | Performed by: STUDENT IN AN ORGANIZED HEALTH CARE EDUCATION/TRAINING PROGRAM

## 2023-07-27 RX ADMIN — ACETAMINOPHEN 650 MG: 325 TABLET ORAL at 07:07

## 2023-07-27 RX ADMIN — ENOXAPARIN SODIUM 120 MG: 120 INJECTION SUBCUTANEOUS at 09:07

## 2023-07-27 RX ADMIN — METOPROLOL TARTRATE 25 MG: 25 TABLET, FILM COATED ORAL at 08:07

## 2023-07-27 RX ADMIN — PIPERACILLIN AND TAZOBACTAM 4.5 G: 4; .5 INJECTION, POWDER, LYOPHILIZED, FOR SOLUTION INTRAVENOUS; PARENTERAL at 05:07

## 2023-07-27 RX ADMIN — INSULIN ASPART 1 UNITS: 100 INJECTION, SOLUTION INTRAVENOUS; SUBCUTANEOUS at 08:07

## 2023-07-27 RX ADMIN — INSULIN ASPART 2 UNITS: 100 INJECTION, SOLUTION INTRAVENOUS; SUBCUTANEOUS at 08:07

## 2023-07-27 RX ADMIN — INSULIN DETEMIR 5 UNITS: 100 INJECTION, SOLUTION SUBCUTANEOUS at 08:07

## 2023-07-27 RX ADMIN — INSULIN ASPART 2 UNITS: 100 INJECTION, SOLUTION INTRAVENOUS; SUBCUTANEOUS at 05:07

## 2023-07-27 RX ADMIN — ENOXAPARIN SODIUM 120 MG: 120 INJECTION SUBCUTANEOUS at 08:07

## 2023-07-27 RX ADMIN — Medication 100 MG: at 08:07

## 2023-07-27 RX ADMIN — OXYCODONE HYDROCHLORIDE 5 MG: 5 TABLET ORAL at 12:07

## 2023-07-27 RX ADMIN — FOLIC ACID 1 MG: 1 TABLET ORAL at 08:07

## 2023-07-27 RX ADMIN — INSULIN ASPART 3 UNITS: 100 INJECTION, SOLUTION INTRAVENOUS; SUBCUTANEOUS at 11:07

## 2023-07-27 RX ADMIN — PIPERACILLIN AND TAZOBACTAM 4.5 G: 4; .5 INJECTION, POWDER, LYOPHILIZED, FOR SOLUTION INTRAVENOUS; PARENTERAL at 09:07

## 2023-07-27 RX ADMIN — VANCOMYCIN HYDROCHLORIDE 1500 MG: 1.5 INJECTION, POWDER, LYOPHILIZED, FOR SOLUTION INTRAVENOUS at 07:07

## 2023-07-27 RX ADMIN — OXYCODONE HYDROCHLORIDE 5 MG: 5 TABLET ORAL at 08:07

## 2023-07-27 RX ADMIN — OXYCODONE HYDROCHLORIDE 5 MG: 5 TABLET ORAL at 09:07

## 2023-07-27 RX ADMIN — PIPERACILLIN AND TAZOBACTAM 4.5 G: 4; .5 INJECTION, POWDER, LYOPHILIZED, FOR SOLUTION INTRAVENOUS; PARENTERAL at 02:07

## 2023-07-27 RX ADMIN — VANCOMYCIN HYDROCHLORIDE 1500 MG: 1.5 INJECTION, POWDER, LYOPHILIZED, FOR SOLUTION INTRAVENOUS at 05:07

## 2023-07-27 RX ADMIN — SODIUM CHLORIDE, POTASSIUM CHLORIDE, SODIUM LACTATE AND CALCIUM CHLORIDE: 600; 310; 30; 20 INJECTION, SOLUTION INTRAVENOUS at 05:07

## 2023-07-27 RX ADMIN — INSULIN ASPART 1 UNITS: 100 INJECTION, SOLUTION INTRAVENOUS; SUBCUTANEOUS at 02:07

## 2023-07-27 NOTE — ASSESSMENT & PLAN NOTE
Moderate hyponatremia- when corrected for hyperglycemia sodium is 128-129  Unclear etiology- F/U serum osm, urine osm, urine sodium  Most likely etiology at this point is hypovolemic  Given 1L NS in ED  Continue LR 100cc/hr  Repeat sodium this evening  Goal correction 6meq per 24h

## 2023-07-27 NOTE — ASSESSMENT & PLAN NOTE
Persistent Afib w/ RVR on rate control with metoprolol at home  Continue metoprolol tartrate 25mg BID- Uptitrate as needed.  Would allow some permissive tachycardia given severe sepsis but should attempt to keep rate <120  Starting therapeutic lovenox for stroke reduction- discussed risks/benefits with patient and they consent to AC

## 2023-07-27 NOTE — ASSESSMENT & PLAN NOTE
"Patient states he drinks "2 shots" per day  Monitor with PRUDENCIO q4h- call MD if >8  Hold off on ativan for now unless patient starts exhibiting withdrawal symptoms  Thiamine and folic acid  Counseled on cessation    "

## 2023-07-27 NOTE — PLAN OF CARE
[facility] - Intensive Care  Initial Discharge Assessment       Primary Care Provider: [facility] [place]    Admission Diagnosis: Atrial fibrillation [I48.91]  Tachycardia [R00.0]  Atrial fibrillation with rapid ventricular response [I48.91]  Diabetic foot infection [E11.628, L08.9]  Chest pain [R07.9]  Diabetic foot ulcer associated with type 2 diabetes mellitus [E11.621, L97.509]  Diabetic ulcer of left foot associated with type 2 diabetes mellitus, limited to breakdown of skin, unspecified part of foot [E11.621, L97.521]    Admission Date: 7/26/2023  Expected Discharge Date:   Assessment completed with patient who was alert and oriented. Patient lives with his spouse,Minda Gtz [phone] who was at bedside. He does not use any equipment at home. He does not attend outpatient therapies and does not have home health services. [facility] as well as all of his specialty doctors. He is independent and drives self to errands and appointments. Patient's pharmacy of choice is [facility]. Patient denies any additional needs at this time. Case management will continue to follow.   Transition of Care Barriers: None    Payor: MEDICARE / Plan: MEDICARE A ONLY / Product Type: Government /     Extended Emergency Contact Information  Primary Emergency Contact: Minda Gtz  Address: [address] [place] [address] [place] [address] [facility]  Home Phone: [phone]  Mobile Phone: [phone]  Relation: Spouse  Preferred language: English   needed? No    Discharge Plan A: Home with family  Discharge Plan B: Home with family      [place] [place] - [address]  [address]  [place] [address]  Phone: [phone] Fax: [phone]    [id number] - [place] - [address]  [address] [place] [address]  Phone: [phone] Fax: [phone]    [id number] - [place] - [address] [address]  [facility]  [place] [address]  Phone: [phone] Fax: [phone]      Initial Assessment (most recent)       Adult Discharge Assessment - 07/27/23 1709          Discharge Assessment    Assessment Type Discharge Planning Assessment     Confirmed/corrected address, phone number and insurance Yes     Confirmed Demographics Correct on Facesheet     Source of Information patient     Does patient/caregiver understand observation status Yes     Communicated STEFAN with patient/caregiver Date not available/Unable to determine     Reason For Admission atrial fibrillation     People in Home spouse   Minda Gtz [phone]    Do you expect to return to your current living situation? Yes     Do you have help at home or someone to help you manage your care at home? Yes     Who are your caregiver(s) and their phone number(s)? Minda Gtz [phone]     Prior to hospitilization cognitive status: Unable to Assess     Current cognitive status: Alert/Oriented     Equipment Currently Used at Home none     Readmission within 30 days? No     Patient currently being followed by outpatient case management? No     Do you currently have service(s) that help you manage your care at home? No     Do you take prescription medications? Yes     Do you have prescription coverage? Yes     Do you have any problems affording any of your prescribed medications? No     Is the patient taking medications as prescribed? yes     Who is going to help you get home at discharge? Minda Gtz [phone]     How do you get to doctors appointments? car, drives self     Are you on dialysis? No     Do you take coumadin? No     Discharge Plan A Home with family     Discharge Plan B Home with family     DME Needed Upon Discharge  none     Discharge Plan discussed with: Patient;Spouse/sig other     Name(s) and Number(s) Minda Gtz [phone]     Transition of Care Barriers None        Physical Activity    On average, how many days  per week do you engage in moderate to strenuous exercise (like a brisk walk)? 3 days     On average, how many minutes do you engage in exercise at this level? 60 min        Financial Resource Strain    How hard is it for you to pay for the very basics like food, housing, medical care, and heating? Not very hard        Housing Stability    In the last 12 months, was there a time when you were not able to pay the mortgage or rent on time? No     In the last 12 months, was there a time when you did not have a steady place to sleep or slept in a shelter (including now)? No        Transportation Needs    In the past 12 months, has lack of transportation kept you from medical appointments or from getting medications? No     In the past 12 months, has lack of transportation kept you from meetings, work, or from getting things needed for daily living? No        Food Insecurity    Within the past 12 months, you worried that your food would run out before you got the money to buy more. Never true     Within the past 12 months, the food you bought just didn't last and you didn't have money to get more. Never true        Stress    Do you feel stress - tense, restless, nervous, or anxious, or unable to sleep at night because your mind is troubled all the time - these days? Not at all        Social Connections    In a typical week, how many times do you talk on the phone with family, friends, or neighbors? Three times a week     How often do you get together with friends or relatives? Once a week     How often do you attend Jewish or Presybeterian services? Never     Do you belong to any clubs or organizations such as Jewish groups, unions, fraternal or athletic groups, or school groups? Yes     How often do you attend meetings of the clubs or organizations you belong to? More than 4 times per year     Are you , , , , never , or living with a partner?         Alcohol Use    Q1: How often  do you have a drink containing alcohol? 4 or more times a week     Q2: How many drinks containing alcohol do you have on a typical day when you are drinking? 1 or 2     Q3: How often do you have six or more drinks on one occasion? Never        OTHER    Name(s) of People in Home Minda Halinacarson [phone]

## 2023-07-27 NOTE — ASSESSMENT & PLAN NOTE
Patient's FSGs are uncontrolled due to hyperglycemia on current medication regimen.  Last A1c reviewed-   Lab Results   Component Value Date    HGBA1C 9.2 (H) 07/26/2023     Most recent fingerstick glucose reviewed-   Recent Labs   Lab 07/26/23  1612 07/26/23  2037 07/27/23  0238 07/27/23  0716   POCTGLUCOSE 266* 248* 216* 209*     Current correctional scale  Low  Maintain anti-hyperglycemic dose as follows-   Antihyperglycemics (From admission, onward)    Start     Stop Route Frequency Ordered    07/26/23 2100  insulin detemir U-100 (Levemir) pen 5 Units         -- SubQ Nightly 07/26/23 1858    07/26/23 1842  insulin aspart U-100 pen 0-5 Units         -- SubQ Before meals & nightly PRN 07/26/23 1743        Hold Oral hypoglycemics while patient is in the hospital.

## 2023-07-27 NOTE — PLAN OF CARE
Problem: Adult Inpatient Plan of Care  Goal: Plan of Care Review  Outcome: Ongoing, Progressing  Goal: Patient-Specific Goal (Individualized)  Outcome: Ongoing, Progressing  Goal: Absence of Hospital-Acquired Illness or Injury  Outcome: Ongoing, Progressing  Goal: Optimal Comfort and Wellbeing  Outcome: Ongoing, Progressing  Goal: Readiness for Transition of Care  Outcome: Ongoing, Progressing     Problem: Diabetes Comorbidity  Goal: Blood Glucose Level Within Targeted Range  Outcome: Ongoing, Progressing     Problem: Adjustment to Illness (Sepsis/Septic Shock)  Goal: Optimal Coping  Outcome: Ongoing, Progressing     Problem: Bleeding (Sepsis/Septic Shock)  Goal: Absence of Bleeding  Outcome: Ongoing, Progressing     Problem: Glycemic Control Impaired (Sepsis/Septic Shock)  Goal: Blood Glucose Level Within Desired Range  Outcome: Ongoing, Progressing     Problem: Infection Progression (Sepsis/Septic Shock)  Goal: Absence of Infection Signs and Symptoms  Outcome: Ongoing, Progressing     Problem: Nutrition Impaired (Sepsis/Septic Shock)  Goal: Optimal Nutrition Intake  Outcome: Ongoing, Progressing     Problem: Skin Injury Risk Increased  Goal: Skin Health and Integrity  Outcome: Ongoing, Progressing     Problem: Fall Injury Risk  Goal: Absence of Fall and Fall-Related Injury  Outcome: Ongoing, Progressing   POC reviewed at bedside. Questions and concerns addressed. VSS. Placed bed in low and locked position. Call light within reach. Side rails up x2. Instructed to call for any needs. Verbalized understanding of all instructions. Frequent rounds.

## 2023-07-27 NOTE — PROGRESS NOTES
LTAC, located within [facility] Medicine  Progress Note    Patient Name: Bubba Gtz  MRN: [id number]  Patient Class: IP- Inpatient   Admission Date: 7/26/2023  Length of Stay: 1 days  Attending Physician: Gómez Arreguin MD  Primary Care Provider: [facility] [place]        Subjective:     Principal Problem:Severe sepsis        HPI:  Patient with a hx of NIDDM2, Afib not on AC, Etoh Abuse presenting with left foot pain. 2 months ago patient was cleaning public pool with a scrubber and scrapped left foot on the pool floor. Blister developed after that. This popped. Wound did not heal and worsened over two months. He has noticed increased swelling, redness, pain in foot over past 2-3 weeks. Endorses some fever and chills. No NVD. Patient states he drinks 2 shots per day and has done this for a long time.       Overview/Hospital Course:  No notes on file    Interval History: LE swelling is improved mildly. Rates under better control. Podiatry consulted. Continuing current treatment plan    Review of Systems   All other systems reviewed and are negative.  Objective:     Vital Signs (Most Recent):  Temp: 98 °F (36.7 °C) (07/27/23 0715)  Pulse: 98 (07/27/23 0715)  Resp: 12 (07/27/23 0944)  BP: 109/76 (07/27/23 0715)  SpO2: 95 % (07/27/23 0917) Vital Signs (24h Range):  Temp:  [98 °F (36.7 °C)-99.3 °F (37.4 °C)] 98 °F (36.7 °C)  Pulse:  [] 98  Resp:  [12-31] 12  SpO2:  [91 %-98 %] 95 %  BP: ()/() 109/76     Weight: 129 kg (284 lb 6.3 oz)  Body mass index is 38.57 kg/m².    Intake/Output Summary (Last 24 hours) at 7/27/2023 1035  Last data filed at 7/27/2023 0521  Gross per 24 hour   Intake 3108.95 ml   Output 800 ml   Net 2308.95 ml         Physical Exam       Vitals reviewed  General: NAD, Well developed, Well Nourished  Head: NC/AT  Eyes: EOMI, GUTIERREZ  Cardiovascular: Pulses intact distally, Regular rate but irregular rhythm  Pulmonary: Normal Respiratory Rate, No  respiratory distress  Gi: Soft, Non-tender  Extremities: Warm, RLE without edema, LLE with edema below knee. Cellulitis extending from foot up to middle of lower leg. Right medial plantar surface of left foot near first metatarsal with drainage from wound with another area of likely abscess forming medial to that. DP pulse present. Sensation decreased to wound per nursing.  Skin: Warm, dry  Neuro: Alert, Oriented x3, No focal Deficit  Psych: Appropriate mood and affect    Significant Labs: All pertinent labs within the past 24 hours have been reviewed.    Significant Imaging: I have reviewed all pertinent imaging results/findings within the past 24 hours.      Assessment/Plan:      * Severe sepsis  This patient does have evidence of infective focus  My overall impression is sepsis.  Source: Skin and Soft Tissue (location diabetic foot wound left foot)  Antibiotics given-   Antibiotics (72h ago, onward)    Start     Stop Route Frequency Ordered    07/27/23 0600  vancomycin (VANCOCIN) 1,500 mg in dextrose 5 % (D5W) 250 mL IVPB  (vancomycin IVPB (PEDS and ADULTS))         -- IV Every 12 hours (non-standard times) 07/26/23 1750    07/26/23 1830  piperacillin-tazobactam (ZOSYN) 4.5 g in dextrose 5 % in water (D5W) 5 % 100 mL IVPB (MB+)         -- IV Every 8 hours (non-standard times) 07/26/23 1730        Latest lactate reviewed-  Recent Labs   Lab 07/26/23  1655 07/26/23  2045   LACTATE 1.5 1.9     Organ dysfunction indicated by Afib w/ RVR    Fluid challenge Not needed - patient is not hypotensive      Post- resuscitation assessment Yes Perfusion exam was performed within 6 hours of septic shock presentation after bolus shows Adequate tissue perfusion assessed by non-invasive monitoring       Will Not start Pressors- Levophed for MAP of 65  Source control achieved by:   Microbiology Results (last 7 days)     Procedure Component Value Units Date/Time    Culture, Anaerobe [[id number]] Collected: 07/26/23 1816    Order  "Status: Sent Specimen: Wound from Foot, Left Updated: 07/26/23 2322    Aerobic culture [[id number]] Collected: 07/26/23 1816    Order Status: Sent Specimen: Wound from Foot, Left Updated: 07/26/23 2322    Blood culture x two cultures. Draw prior to antibiotics. [[id number]] Collected: 07/26/23 1726    Order Status: Sent Specimen: Blood from Peripheral, Lower Arm, Right Updated: 07/26/23 1727    Blood culture x two cultures. Draw prior to antibiotics. [[id number]] Collected: 07/26/23 1655    Order Status: Sent Specimen: Blood from Peripheral, Antecubital, Left Updated: 07/26/23 1657        Continue IV abx  F/U cultures  Consulted podiatry  Apply skin marker to borders of cellulitis and elevate foot  Pictures in chart  Xray completed and results reviewed  q6h neurovascular checks    Chronic hyponatremia  Moderate hyponatremia- when corrected for hyperglycemia sodium is 128-129- Improved to 130 this am  Unclear etiology- F/U serum osm, urine osm, urine sodium  Most likely etiology at this point is hypovolemic  Given 1L NS in ED  Continue LR 100cc/hr  Goal correction 6meq per 24h      Alcohol use  Patient states he drinks "2 shots" per day  Monitor with PRUDENCIO q4h- call MD if >8  Hold off on ativan for now unless patient starts exhibiting withdrawal symptoms  Thiamine and folic acid  Counseled on cessation      Non-insulin dependent type 2 diabetes mellitus  Patient's FSGs are uncontrolled due to hyperglycemia on current medication regimen.  Last A1c reviewed-   Lab Results   Component Value Date    HGBA1C 9.2 (H) 07/26/2023     Most recent fingerstick glucose reviewed-   Recent Labs   Lab 07/26/23  1612 07/26/23  2037 07/27/23  0238 07/27/23  0716   POCTGLUCOSE 266* 248* 216* 209*     Current correctional scale  Low  Maintain anti-hyperglycemic dose as follows-   Antihyperglycemics (From admission, onward)    Start     Stop Route Frequency Ordered    07/26/23 2100  insulin detemir U-100 (Levemir) pen 5 Units         -- SubQ " Nightly 07/26/23 1858    07/26/23 1842  insulin aspart U-100 pen 0-5 Units         -- SubQ Before meals & nightly PRN 07/26/23 1743        Hold Oral hypoglycemics while patient is in the hospital.    Atrial fibrillation with RVR  Persistent Afib w/ RVR on rate control with metoprolol at home  Continue metoprolol tartrate 25mg BID- Uptitrate as needed.  Would allow some permissive tachycardia given severe sepsis but should attempt to keep rate <120  Starting therapeutic lovenox for stroke reduction- discussed risks/benefits with patient and they consent to AC      VTE Risk Mitigation (From admission, onward)         Ordered     enoxaparin injection 120 mg  Every 12 hours         07/26/23 1947     Reason for No Pharmacological VTE Prophylaxis  Once        Question:  Reasons:  Answer:  Physician Provided (leave comment)    07/26/23 1743     IP VTE HIGH RISK PATIENT  Once         07/26/23 1743     Place sequential compression device  Until discontinued         07/26/23 1743                Discharge Planning   STEFAN:      Code Status: Full Code   Is the patient medically ready for discharge?:     Reason for patient still in hospital (select all that apply): Treatment                     Gómez Arreguin MD  Department of Hospital Medicine   [facility] Intensive [facility]

## 2023-07-27 NOTE — ASSESSMENT & PLAN NOTE
This patient does have evidence of infective focus  My overall impression is sepsis.  Source: Skin and Soft Tissue (location diabetic foot wound left foot)  Antibiotics given-   Antibiotics (72h ago, onward)    Start     Stop Route Frequency Ordered    07/27/23 0600  vancomycin (VANCOCIN) 1,500 mg in dextrose 5 % (D5W) 250 mL IVPB  (vancomycin IVPB (PEDS and ADULTS))         -- IV Every 12 hours (non-standard times) 07/26/23 1750    07/26/23 1830  piperacillin-tazobactam (ZOSYN) 4.5 g in dextrose 5 % in water (D5W) 5 % 100 mL IVPB (MB+)         -- IV Every 8 hours (non-standard times) 07/26/23 1730        Latest lactate reviewed-  Recent Labs   Lab 07/26/23 1655   LACTATE 1.5     Organ dysfunction indicated by Afib w/ RVR    Fluid challenge Not needed - patient is not hypotensive      Post- resuscitation assessment Yes Perfusion exam was performed within 6 hours of septic shock presentation after bolus shows Adequate tissue perfusion assessed by non-invasive monitoring       Will Not start Pressors- Levophed for MAP of 65  Source control achieved by:   Microbiology Results (last 7 days)     Procedure Component Value Units Date/Time    Culture, Anaerobe [[id number]] Collected: 07/26/23 1816    Order Status: Sent Specimen: Wound from Foot, Left Updated: 07/26/23 1823    Aerobic culture [[id number]] Collected: 07/26/23 1816    Order Status: Sent Specimen: Wound from Foot, Left Updated: 07/26/23 1823    Blood culture x two cultures. Draw prior to antibiotics. [[id number]] Collected: 07/26/23 1726    Order Status: Sent Specimen: Blood from Peripheral, Lower Arm, Right Updated: 07/26/23 1727    Blood culture x two cultures. Draw prior to antibiotics. [[id number]] Collected: 07/26/23 1655    Order Status: Sent Specimen: Blood from Peripheral, Antecubital, Left Updated: 07/26/23 1657        Continue IV abx  F/U cultures  Consult podiatry in am  Apply skin marker to borders of cellulitis and elevate foot  Pictures in  chart  Xray completed and results reviewed  q6h neurovascular checks

## 2023-07-27 NOTE — ASSESSMENT & PLAN NOTE
Moderate hyponatremia- when corrected for hyperglycemia sodium is 128-129- Improved to 130 this am  Unclear etiology- F/U serum osm, urine osm, urine sodium  Most likely etiology at this point is hypovolemic  Given 1L NS in ED  Continue LR 100cc/hr  Goal correction 6meq per 24h

## 2023-07-27 NOTE — SUBJECTIVE & OBJECTIVE
Interval History: LE swelling is improved mildly. Rates under better control. Podiatry consulted. Continuing current treatment plan    Review of Systems   All other systems reviewed and are negative.  Objective:     Vital Signs (Most Recent):  Temp: 98 °F (36.7 °C) (07/27/23 0715)  Pulse: 98 (07/27/23 0715)  Resp: 12 (07/27/23 0944)  BP: 109/76 (07/27/23 0715)  SpO2: 95 % (07/27/23 0917) Vital Signs (24h Range):  Temp:  [98 °F (36.7 °C)-99.3 °F (37.4 °C)] 98 °F (36.7 °C)  Pulse:  [] 98  Resp:  [12-31] 12  SpO2:  [91 %-98 %] 95 %  BP: ()/() 109/76     Weight: 129 kg (284 lb 6.3 oz)  Body mass index is 38.57 kg/m².    Intake/Output Summary (Last 24 hours) at 7/27/2023 1035  Last data filed at 7/27/2023 0521  Gross per 24 hour   Intake 3108.95 ml   Output 800 ml   Net 2308.95 ml         Physical Exam       Vitals reviewed  General: NAD, Well developed, Well Nourished  Head: NC/AT  Eyes: EOMI, GUTIERREZ  Cardiovascular: Pulses intact distally, Regular rate but irregular rhythm  Pulmonary: Normal Respiratory Rate, No respiratory distress  Gi: Soft, Non-tender  Extremities: Warm, RLE without edema, LLE with edema below knee. Cellulitis extending from foot up to middle of lower leg. Right medial plantar surface of left foot near first metatarsal with drainage from wound with another area of likely abscess forming medial to that. DP pulse present. Sensation decreased to wound per nursing.  Skin: Warm, dry  Neuro: Alert, Oriented x3, No focal Deficit  Psych: Appropriate mood and affect    Significant Labs: All pertinent labs within the past 24 hours have been reviewed.    Significant Imaging: I have reviewed all pertinent imaging results/findings within the past 24 hours.

## 2023-07-27 NOTE — H&P
[facility]  [facility] Medicine  History & Physical    Patient Name: Bubba Gtz  MRN: [id number]  Patient Class: IP- Inpatient  Admission Date: 7/26/2023  Attending Physician: Gómez Arreguin MD   Primary Care Provider: [facility] [place]         Patient information was obtained from patient and ER records.     Subjective:     Principal Problem:Severe sepsis    Chief Complaint:   Chief Complaint   Patient presents with    Leg Swelling     LLE pain and swelling with wound to bottom of L foot x 1 month        HPI: Patient with a hx of NIDDM2, Afib not on AC, Etoh Abuse presenting with left foot pain. 2 months ago patient was cleaning public pool with a scrubber and scrapped left foot on the pool floor. Blister developed after that. This popped. Wound did not heal and worsened over two months. He has noticed increased swelling, redness, pain in foot over past 2-3 weeks. Endorses some fever and chills. No NVD. Patient states he drinks 2 shots per day and has done this for a long time.       Past Medical History:   Diagnosis Date    A-fib     Diabetes mellitus, type 2     Hypertension     Primary osteoarthritis of left knee 9/19/2022       Past Surgical History:   Procedure Laterality Date    JOINT REPLACEMENT  2013    Knee    KNEE ARTHROSCOPY      TOTAL KNEE ARTHROPLASTY Left 9/19/2022    Procedure: LEFT ARTHROPLASTY, KNEE, TOTAL;  Surgeon: Ludwin Palacio DO;  Location: [facility] OR;  Service: Orthopedics;  Laterality: Left;       Review of patient's allergies indicates:  No Known Allergies    No current facility-administered medications on file prior to encounter.     Current Outpatient Medications on File Prior to Encounter   Medication Sig    metFORMIN (GLUCOPHAGE) 1000 MG tablet 500 mg.    metoprolol succinate (TOPROL-XL) 25 MG 24 hr tablet Take 25 mg by mouth once daily.    sildenafiL (VIAGRA) 100 MG tablet 100 mg.     Family History       Problem Relation (Age of Onset)     Diabetes Mother          Tobacco Use    Smoking status: Never    Smokeless tobacco: Never   Substance and Sexual Activity    Alcohol use: Yes    Drug use: Never    Sexual activity: Not on file     Review of Systems   All other systems reviewed and are negative.  Objective:     Vital Signs (Most Recent):  Temp: 98 °F (36.7 °C) (07/26/23 1607)  Pulse: 109 (07/26/23 1818)  Resp: 16 (07/26/23 1828)  BP: 112/84 (07/26/23 1818)  SpO2: (!) 91 % (07/26/23 1818) Vital Signs (24h Range):  Temp:  [98 °F (36.7 °C)] 98 °F (36.7 °C)  Pulse:  [109-140] 109  Resp:  [14-31] 16  SpO2:  [91 %-98 %] 91 %  BP: (112-139)/() 112/84     Weight: 113.4 kg (250 lb)  Body mass index is 32.98 kg/m².     Physical Exam      Vitals reviewed  General: NAD, Well developed, Well Nourished  Head: NC/AT  Eyes: EOMI, GUTIERREZ  Cardiovascular: Pulses intact distally, Irregular rate and rhythm  Pulmonary: Normal Respiratory Rate, No respiratory distress  Gi: Soft, Non-tender  Extremities: Warm, RLE without edema, LLE with edema below knee. Cellulitis extending from foot up to middle of lower leg. Right medial plantar surface of left foot near first metatarsal with drainage from wound with another area of likely abscess forming medial to that. DP pulse present. Sensation decreased to wound per nursing.  Skin: Warm, dry  Neuro: Alert, Oriented x3, No focal Deficit  Psych: Appropriate mood and affect       Significant Labs: All pertinent labs within the past 24 hours have been reviewed.    Significant Imaging: I have reviewed all pertinent imaging results/findings within the past 24 hours.    Assessment/Plan:     * Severe sepsis  This patient does have evidence of infective focus  My overall impression is sepsis.  Source: Skin and Soft Tissue (location diabetic foot wound left foot)  Antibiotics given-   Antibiotics (72h ago, onward)      Start     Stop Route Frequency Ordered    07/27/23 0600  vancomycin (VANCOCIN) 1,500 mg in dextrose 5 % (D5W) 250 mL  IVPB  (vancomycin IVPB (PEDS and ADULTS))         -- IV Every 12 hours (non-standard times) 07/26/23 1750    07/26/23 1830  piperacillin-tazobactam (ZOSYN) 4.5 g in dextrose 5 % in water (D5W) 5 % 100 mL IVPB (MB+)         -- IV Every 8 hours (non-standard times) 07/26/23 1730          Latest lactate reviewed-  Recent Labs   Lab 07/26/23 1655   LACTATE 1.5     Organ dysfunction indicated by Afib w/ RVR    Fluid challenge Not needed - patient is not hypotensive      Post- resuscitation assessment Yes Perfusion exam was performed within 6 hours of septic shock presentation after bolus shows Adequate tissue perfusion assessed by non-invasive monitoring       Will Not start Pressors- Levophed for MAP of 65  Source control achieved by:   Microbiology Results (last 7 days)       Procedure Component Value Units Date/Time    Culture, Anaerobe [[id number]] Collected: 07/26/23 1816    Order Status: Sent Specimen: Wound from Foot, Left Updated: 07/26/23 1823    Aerobic culture [[id number]] Collected: 07/26/23 1816    Order Status: Sent Specimen: Wound from Foot, Left Updated: 07/26/23 1823    Blood culture x two cultures. Draw prior to antibiotics. [[id number]] Collected: 07/26/23 1726    Order Status: Sent Specimen: Blood from Peripheral, Lower Arm, Right Updated: 07/26/23 1727    Blood culture x two cultures. Draw prior to antibiotics. [[id number]] Collected: 07/26/23 1655    Order Status: Sent Specimen: Blood from Peripheral, Antecubital, Left Updated: 07/26/23 1657          Continue IV abx  F/U cultures  Consult podiatry in am  Apply skin marker to borders of cellulitis and elevate foot  Pictures in chart  Xray completed and results reviewed  q6h neurovascular checks    Chronic hyponatremia  Moderate hyponatremia- when corrected for hyperglycemia sodium is 128-129  Unclear etiology- F/U serum osm, urine osm, urine sodium  Most likely etiology at this point is hypovolemic  Given 1L NS in ED  Continue LR 100cc/hr  Repeat sodium  "this evening  Goal correction 6meq per 24h      Alcohol use  Patient states he drinks "2 shots" per day  Monitor with PRUDENCIO q4h- call MD if >8  Hold off on ativan for now unless patient starts exhibiting withdrawal symptoms  Thiamine and folic acid  Counseled on cessation      Non-insulin dependent type 2 diabetes mellitus  Patient's FSGs are uncontrolled due to hyperglycemia on current medication regimen.  Last A1c reviewed-   Lab Results   Component Value Date    HGBA1C 9.2 (H) 07/26/2023     Most recent fingerstick glucose reviewed-   Recent Labs   Lab 07/26/23  1612   POCTGLUCOSE 266*     Current correctional scale  Low  Maintain anti-hyperglycemic dose as follows-   Antihyperglycemics (From admission, onward)      Start     Stop Route Frequency Ordered    07/26/23 2100  insulin detemir U-100 (Levemir) pen 5 Units         -- SubQ Nightly 07/26/23 1858    07/26/23 1842  insulin aspart U-100 pen 0-5 Units         -- SubQ Before meals & nightly PRN 07/26/23 1743          Hold Oral hypoglycemics while patient is in the hospital.    Atrial fibrillation with RVR  Persistent Afib w/ RVR on rate control with metoprolol at home  Giving 1 dose IV lopressor and starting metoprolol tartrate 25mg BID- Uptitrate as needed.  Would allow some permissive tachycardia given severe sepsis but should attempt to keep rate <120  Starting therapeutic lovenox for stroke reduction- discussed risks/benefits with patient and they consent to AC      VTE Risk Mitigation (From admission, onward)           Ordered     enoxaparin injection 120 mg  Every 12 hours         07/26/23 1947     Reason for No Pharmacological VTE Prophylaxis  Once        Question:  Reasons:  Answer:  Physician Provided (leave comment)    07/26/23 1743     IP VTE HIGH RISK PATIENT  Once         07/26/23 1743     Place sequential compression device  Until discontinued         07/26/23 1743                               Gómez Arreguin MD  Department of Hospital " Medicine  [facility] Emergency Dept

## 2023-07-27 NOTE — PLAN OF CARE
07/27/23 1712   Medicare Message   Important Message from Medicare regarding Discharge Appeal Rights Explained to patient/caregiver;Signed/date by patient/caregiver   Date IMM was signed 07/27/23   Time IMM was signed [id number]

## 2023-07-27 NOTE — ASSESSMENT & PLAN NOTE
This patient does have evidence of infective focus  My overall impression is sepsis.  Source: Skin and Soft Tissue (location diabetic foot wound left foot)  Antibiotics given-   Antibiotics (72h ago, onward)    Start     Stop Route Frequency Ordered    07/27/23 0600  vancomycin (VANCOCIN) 1,500 mg in dextrose 5 % (D5W) 250 mL IVPB  (vancomycin IVPB (PEDS and ADULTS))         -- IV Every 12 hours (non-standard times) 07/26/23 1750    07/26/23 1830  piperacillin-tazobactam (ZOSYN) 4.5 g in dextrose 5 % in water (D5W) 5 % 100 mL IVPB (MB+)         -- IV Every 8 hours (non-standard times) 07/26/23 1730        Latest lactate reviewed-  Recent Labs   Lab 07/26/23 1655 07/26/23 2045   LACTATE 1.5 1.9     Organ dysfunction indicated by Afib w/ RVR    Fluid challenge Not needed - patient is not hypotensive      Post- resuscitation assessment Yes Perfusion exam was performed within 6 hours of septic shock presentation after bolus shows Adequate tissue perfusion assessed by non-invasive monitoring       Will Not start Pressors- Levophed for MAP of 65  Source control achieved by:   Microbiology Results (last 7 days)     Procedure Component Value Units Date/Time    Culture, Anaerobe [[id number]] Collected: 07/26/23 1816    Order Status: Sent Specimen: Wound from Foot, Left Updated: 07/26/23 2322    Aerobic culture [[id number]] Collected: 07/26/23 1816    Order Status: Sent Specimen: Wound from Foot, Left Updated: 07/26/23 2322    Blood culture x two cultures. Draw prior to antibiotics. [[id number]] Collected: 07/26/23 1726    Order Status: Sent Specimen: Blood from Peripheral, Lower Arm, Right Updated: 07/26/23 1727    Blood culture x two cultures. Draw prior to antibiotics. [[id number]] Collected: 07/26/23 1655    Order Status: Sent Specimen: Blood from Peripheral, Antecubital, Left Updated: 07/26/23 1657        Continue IV abx  F/U cultures  Consulted podiatry  Apply skin marker to borders of cellulitis and elevate  foot  Pictures in chart  Xray completed and results reviewed  q6h neurovascular checks

## 2023-07-28 LAB
ALBUMIN SERPL BCP-MCNC: 2.6 G/DL (ref 3.5–5.2)
ALP SERPL-CCNC: 58 U/L (ref 55–135)
ALT SERPL W/O P-5'-P-CCNC: 35 U/L (ref 10–44)
ANION GAP SERPL CALC-SCNC: 13 MMOL/L (ref 8–16)
AST SERPL-CCNC: 32 U/L (ref 10–40)
BASOPHILS # BLD AUTO: 0.06 K/UL (ref 0–0.2)
BASOPHILS NFR BLD: 0.6 % (ref 0–1.9)
BILIRUB SERPL-MCNC: 0.9 MG/DL (ref 0.1–1)
BUN SERPL-MCNC: 9 MG/DL (ref 8–23)
CALCIUM SERPL-MCNC: 8.2 MG/DL (ref 8.7–10.5)
CHLORIDE SERPL-SCNC: 95 MMOL/L (ref 95–110)
CO2 SERPL-SCNC: 23 MMOL/L (ref 23–29)
CREAT SERPL-MCNC: 1 MG/DL (ref 0.5–1.4)
DIFFERENTIAL METHOD: ABNORMAL
EOSINOPHIL # BLD AUTO: 0.3 K/UL (ref 0–0.5)
EOSINOPHIL NFR BLD: 3 % (ref 0–8)
ERYTHROCYTE [DISTWIDTH] IN BLOOD BY AUTOMATED COUNT: 12.3 % (ref 11.5–14.5)
EST. GFR  (NO RACE VARIABLE): >60 ML/MIN/1.73 M^2
GLUCOSE SERPL-MCNC: 345 MG/DL (ref 70–110)
HCT VFR BLD AUTO: 31.8 % (ref 40–54)
HGB BLD-MCNC: 11 G/DL (ref 14–18)
IMM GRANULOCYTES # BLD AUTO: 0.05 K/UL (ref 0–0.04)
IMM GRANULOCYTES NFR BLD AUTO: 0.5 % (ref 0–0.5)
LYMPHOCYTES # BLD AUTO: 1.6 K/UL (ref 1–4.8)
LYMPHOCYTES NFR BLD: 16.2 % (ref 18–48)
MAGNESIUM SERPL-MCNC: 1.9 MG/DL (ref 1.6–2.6)
MCH RBC QN AUTO: 31.6 PG (ref 27–31)
MCHC RBC AUTO-ENTMCNC: 34.6 G/DL (ref 32–36)
MCV RBC AUTO: 91 FL (ref 82–98)
MONOCYTES # BLD AUTO: 0.8 K/UL (ref 0.3–1)
MONOCYTES NFR BLD: 7.7 % (ref 4–15)
NEUTROPHILS # BLD AUTO: 7.1 K/UL (ref 1.8–7.7)
NEUTROPHILS NFR BLD: 72 % (ref 38–73)
NRBC BLD-RTO: 0 /100 WBC
PHOSPHATE SERPL-MCNC: 2.8 MG/DL (ref 2.7–4.5)
PLATELET # BLD AUTO: 223 K/UL (ref 150–450)
PMV BLD AUTO: 9.8 FL (ref 9.2–12.9)
POCT GLUCOSE: 174 MG/DL (ref 70–110)
POCT GLUCOSE: 183 MG/DL (ref 70–110)
POCT GLUCOSE: 221 MG/DL (ref 70–110)
POCT GLUCOSE: 231 MG/DL (ref 70–110)
POCT GLUCOSE: 278 MG/DL (ref 70–110)
POTASSIUM SERPL-SCNC: 3.9 MMOL/L (ref 3.5–5.1)
PROT SERPL-MCNC: 6.8 G/DL (ref 6–8.4)
RBC # BLD AUTO: 3.48 M/UL (ref 4.6–6.2)
SODIUM SERPL-SCNC: 131 MMOL/L (ref 136–145)
VANCOMYCIN TROUGH SERPL-MCNC: 15 UG/ML (ref 10–22)
WBC # BLD AUTO: 9.85 K/UL (ref 3.9–12.7)

## 2023-07-28 PROCEDURE — 84100 ASSAY OF PHOSPHORUS: CPT | Performed by: STUDENT IN AN ORGANIZED HEALTH CARE EDUCATION/TRAINING PROGRAM

## 2023-07-28 PROCEDURE — 83735 ASSAY OF MAGNESIUM: CPT | Performed by: STUDENT IN AN ORGANIZED HEALTH CARE EDUCATION/TRAINING PROGRAM

## 2023-07-28 PROCEDURE — 21400001 HC TELEMETRY ROOM

## 2023-07-28 PROCEDURE — 99233 PR SUBSEQUENT HOSPITAL CARE,LEVL III: ICD-10-PCS | Mod: ,,, | Performed by: PODIATRIST

## 2023-07-28 PROCEDURE — 63600175 PHARM REV CODE 636 W HCPCS: Performed by: STUDENT IN AN ORGANIZED HEALTH CARE EDUCATION/TRAINING PROGRAM

## 2023-07-28 PROCEDURE — 99233 PR SUBSEQUENT HOSPITAL CARE,LEVL III: ICD-10-PCS | Mod: ,,, | Performed by: STUDENT IN AN ORGANIZED HEALTH CARE EDUCATION/TRAINING PROGRAM

## 2023-07-28 PROCEDURE — 80202 ASSAY OF VANCOMYCIN: CPT | Performed by: STUDENT IN AN ORGANIZED HEALTH CARE EDUCATION/TRAINING PROGRAM

## 2023-07-28 PROCEDURE — 99233 SBSQ HOSP IP/OBS HIGH 50: CPT | Mod: ,,, | Performed by: PODIATRIST

## 2023-07-28 PROCEDURE — 85025 COMPLETE CBC W/AUTO DIFF WBC: CPT | Performed by: STUDENT IN AN ORGANIZED HEALTH CARE EDUCATION/TRAINING PROGRAM

## 2023-07-28 PROCEDURE — 25000003 PHARM REV CODE 250: Performed by: STUDENT IN AN ORGANIZED HEALTH CARE EDUCATION/TRAINING PROGRAM

## 2023-07-28 PROCEDURE — 80053 COMPREHEN METABOLIC PANEL: CPT | Performed by: STUDENT IN AN ORGANIZED HEALTH CARE EDUCATION/TRAINING PROGRAM

## 2023-07-28 PROCEDURE — 99233 SBSQ HOSP IP/OBS HIGH 50: CPT | Mod: ,,, | Performed by: STUDENT IN AN ORGANIZED HEALTH CARE EDUCATION/TRAINING PROGRAM

## 2023-07-28 RX ORDER — MUPIROCIN 20 MG/G
OINTMENT TOPICAL 2 TIMES DAILY
Status: DISCONTINUED | OUTPATIENT
Start: 2023-07-28 | End: 2023-07-31 | Stop reason: HOSPADM

## 2023-07-28 RX ADMIN — VANCOMYCIN HYDROCHLORIDE 1500 MG: 1.5 INJECTION, POWDER, LYOPHILIZED, FOR SOLUTION INTRAVENOUS at 05:07

## 2023-07-28 RX ADMIN — FOLIC ACID 1 MG: 1 TABLET ORAL at 08:07

## 2023-07-28 RX ADMIN — VANCOMYCIN HYDROCHLORIDE 1500 MG: 1.5 INJECTION, POWDER, LYOPHILIZED, FOR SOLUTION INTRAVENOUS at 06:07

## 2023-07-28 RX ADMIN — INSULIN ASPART 1 UNITS: 100 INJECTION, SOLUTION INTRAVENOUS; SUBCUTANEOUS at 08:07

## 2023-07-28 RX ADMIN — INSULIN ASPART 2 UNITS: 100 INJECTION, SOLUTION INTRAVENOUS; SUBCUTANEOUS at 08:07

## 2023-07-28 RX ADMIN — OXYCODONE HYDROCHLORIDE 5 MG: 5 TABLET ORAL at 08:07

## 2023-07-28 RX ADMIN — PIPERACILLIN AND TAZOBACTAM 4.5 G: 4; .5 INJECTION, POWDER, LYOPHILIZED, FOR SOLUTION INTRAVENOUS; PARENTERAL at 02:07

## 2023-07-28 RX ADMIN — Medication 100 MG: at 08:07

## 2023-07-28 RX ADMIN — MUPIROCIN: 20 OINTMENT TOPICAL at 09:07

## 2023-07-28 RX ADMIN — ENOXAPARIN SODIUM 120 MG: 120 INJECTION SUBCUTANEOUS at 08:07

## 2023-07-28 RX ADMIN — METOPROLOL TARTRATE 25 MG: 25 TABLET, FILM COATED ORAL at 08:07

## 2023-07-28 RX ADMIN — INSULIN ASPART 3 UNITS: 100 INJECTION, SOLUTION INTRAVENOUS; SUBCUTANEOUS at 11:07

## 2023-07-28 RX ADMIN — PIPERACILLIN AND TAZOBACTAM 4.5 G: 4; .5 INJECTION, POWDER, LYOPHILIZED, FOR SOLUTION INTRAVENOUS; PARENTERAL at 06:07

## 2023-07-28 RX ADMIN — MUPIROCIN: 20 OINTMENT TOPICAL at 08:07

## 2023-07-28 RX ADMIN — PIPERACILLIN AND TAZOBACTAM 4.5 G: 4; .5 INJECTION, POWDER, LYOPHILIZED, FOR SOLUTION INTRAVENOUS; PARENTERAL at 10:07

## 2023-07-28 NOTE — SUBJECTIVE & OBJECTIVE
Interval History: NAEON. MRI Today. Wound cultures pending    Review of Systems   All other systems reviewed and are negative.  Objective:     Vital Signs (Most Recent):  Temp: 99.1 °F (37.3 °C) (07/28/23 1117)  Pulse: 97 (07/28/23 1117)  Resp: 16 (07/28/23 1117)  BP: 119/80 (07/28/23 1117)  SpO2: 97 % (07/28/23 1117) Vital Signs (24h Range):  Temp:  [98.2 °F (36.8 °C)-100.8 °F (38.2 °C)] 99.1 °F (37.3 °C)  Pulse:  [] 97  Resp:  [16-28] 16  SpO2:  [80 %-99 %] 97 %  BP: (101-136)/(62-83) 119/80     Weight: 129 kg (284 lb 6.3 oz)  Body mass index is 38.57 kg/m².    Intake/Output Summary (Last 24 hours) at 7/28/2023 1413  Last data filed at 7/28/2023 1031  Gross per 24 hour   Intake 1947.92 ml   Output 1650 ml   Net 297.92 ml         Physical Exam      Vitals reviewed  General: NAD, Well developed, Well Nourished  Head: NC/AT  Eyes: EOMI, GUTIERREZ  Cardiovascular: Pulses intact distally, Regular Rate and irregular rhythm  Pulmonary: Normal Respiratory Rate, No respiratory distress  Gi: Soft, Non-tender  Extremities: Warm, Left foot wrapped with surgical dressing  Skin: Warm, dry  Neuro: Alert, Oriented x3, No focal Deficit  Psych: Appropriate mood and affect    Significant Labs: All pertinent labs within the past 24 hours have been reviewed.    Significant Imaging: I have reviewed all pertinent imaging results/findings within the past 24 hours.

## 2023-07-28 NOTE — PROGRESS NOTES
[facility] Medicine  Progress Note    Patient Name: Bubba Gtz  MRN: [id number]  Patient Class: IP- Inpatient   Admission Date: 7/26/2023  Length of Stay: 2 days  Attending Physician: Gómez Arreguin MD  Primary Care Provider: [facility] [place]        Subjective:     Principal Problem:Severe sepsis        HPI:  Patient with a hx of NIDDM2, Afib not on AC, Etoh Abuse presenting with left foot pain. 2 months ago patient was cleaning public pool with a scrubber and scrapped left foot on the pool floor. Blister developed after that. This popped. Wound did not heal and worsened over two months. He has noticed increased swelling, redness, pain in foot over past 2-3 weeks. Endorses some fever and chills. No NVD. Patient states he drinks 2 shots per day and has done this for a long time.       Overview/Hospital Course:  Patient admitted for severe sepsis w/ Afib w/ RVR 2/2 Left foot diabetic foot ulcer. Started on broad spectrum abx. Rates controlled with PO lopressor. Asymptomatic chronic hyponatremia that improved with IVF. Podiatry consulted. Bedside debridement performed. MRI today to determine need for surgical intervention. Wound cultures pending.       Interval History: NAEON. MRI Today. Wound cultures pending    Review of Systems   All other systems reviewed and are negative.  Objective:     Vital Signs (Most Recent):  Temp: 99.1 °F (37.3 °C) (07/28/23 1117)  Pulse: 97 (07/28/23 1117)  Resp: 16 (07/28/23 1117)  BP: 119/80 (07/28/23 1117)  SpO2: 97 % (07/28/23 1117) Vital Signs (24h Range):  Temp:  [98.2 °F (36.8 °C)-100.8 °F (38.2 °C)] 99.1 °F (37.3 °C)  Pulse:  [] 97  Resp:  [16-28] 16  SpO2:  [80 %-99 %] 97 %  BP: (101-136)/(62-83) 119/80     Weight: 129 kg (284 lb 6.3 oz)  Body mass index is 38.57 kg/m².    Intake/Output Summary (Last 24 hours) at 7/28/2023 1413  Last data filed at 7/28/2023 1031  Gross per 24 hour   Intake 1947.92 ml   Output 1650 ml    Net 297.92 ml         Physical Exam      Vitals reviewed  General: NAD, Well developed, Well Nourished  Head: NC/AT  Eyes: EOMI, GUTIERREZ  Cardiovascular: Pulses intact distally, Regular Rate and irregular rhythm  Pulmonary: Normal Respiratory Rate, No respiratory distress  Gi: Soft, Non-tender  Extremities: Warm, Left foot wrapped with surgical dressing  Skin: Warm, dry  Neuro: Alert, Oriented x3, No focal Deficit  Psych: Appropriate mood and affect    Significant Labs: All pertinent labs within the past 24 hours have been reviewed.    Significant Imaging: I have reviewed all pertinent imaging results/findings within the past 24 hours.      Assessment/Plan:      * Severe sepsis  This patient does have evidence of infective focus  My overall impression is sepsis.  Source: Skin and Soft Tissue (location diabetic foot wound left foot)  Antibiotics given-   Antibiotics (72h ago, onward)    Start     Stop Route Frequency Ordered    07/28/23 0900  mupirocin 2 % ointment         08/02 0859 Nasl 2 times daily 07/28/23 0725    07/27/23 0600  vancomycin (VANCOCIN) 1,500 mg in dextrose 5 % (D5W) 250 mL IVPB  (vancomycin IVPB (PEDS and ADULTS))         -- IV Every 12 hours (non-standard times) 07/26/23 1750    07/26/23 1830  piperacillin-tazobactam (ZOSYN) 4.5 g in dextrose 5 % in water (D5W) 5 % 100 mL IVPB (MB+)         -- IV Every 8 hours (non-standard times) 07/26/23 1730        Latest lactate reviewed-  Recent Labs   Lab 07/26/23  1655 07/26/23  2045   LACTATE 1.5 1.9     Organ dysfunction indicated by Afib w/ RVR    Fluid challenge Not needed - patient is not hypotensive      Post- resuscitation assessment Yes Perfusion exam was performed within 6 hours of septic shock presentation after bolus shows Adequate tissue perfusion assessed by non-invasive monitoring       Will Not start Pressors- Levophed for MAP of 65  Source control achieved by:   Microbiology Results (last 7 days)     Procedure Component Value Units Date/Time  "   Culture, Anaerobe [[id number]] Collected: 07/26/23 1816    Order Status: Completed Specimen: Wound from Foot, Left Updated: 07/28/23 0749     Anaerobic Culture Culture in progress    Aerobic culture [[id number]]  (Abnormal) Collected: 07/26/23 1816    Order Status: Completed Specimen: Wound from Foot, Left Updated: 07/28/23 0705     Aerobic Bacterial Culture GRAM NEGATIVE BEVERLY  Many  Identification and susceptibility pending  Skin mellisa also present      Blood culture x two cultures. Draw prior to antibiotics. [[id number]] Collected: 07/26/23 1655    Order Status: Completed Specimen: Blood from Peripheral, Antecubital, Left Updated: 07/28/23 0115     Blood Culture, Routine No Growth to date    Narrative:      Aerobic and anaerobic    Blood culture x two cultures. Draw prior to antibiotics. [[id number]] Collected: 07/26/23 1726    Order Status: Completed Specimen: Blood from Peripheral, Lower Arm, Right Updated: 07/27/23 2145     Blood Culture, Routine No Growth to date    Narrative:      Aerobic and anaerobic        Continue IV abx  F/U cultures  Consulted podiatry  Apply skin marker to borders of cellulitis and elevate foot  Pictures in chart  Xray completed and results reviewed  MRI ordered  q6h neurovascular checks    Chronic hyponatremia  Moderate hyponatremia- when corrected for hyperglycemia sodium is 128-129- Improved with IVF  Unclear etiology- F/U serum osm, urine osm, urine sodium  Most likely etiology at this point is hypovolemic  Given 1L NS in ED        Alcohol use  Patient states he drinks "2 shots" per day  Monitor with PRUDENCIO q4h- call MD if >8  Hold off on ativan for now unless patient starts exhibiting withdrawal symptoms  Thiamine and folic acid  Counseled on cessation      Non-insulin dependent type 2 diabetes mellitus  Patient's FSGs are uncontrolled due to hyperglycemia on current medication regimen.  Last A1c reviewed-   Lab Results   Component Value Date    HGBA1C 9.2 (H) 07/26/2023     Most " recent fingerstick glucose reviewed-   Recent Labs   Lab 07/27/23  2042 07/28/23  0208 07/28/23  0709 07/28/23  1115   POCTGLUCOSE 280* 183* 221* 278*     Current correctional scale  Low  Maintain anti-hyperglycemic dose as follows-   Antihyperglycemics (From admission, onward)    Start     Stop Route Frequency Ordered    07/28/23 2100  insulin detemir U-100 (Levemir) pen 15 Units         -- SubQ Nightly 07/28/23 0719    07/26/23 1842  insulin aspart U-100 pen 0-5 Units         -- SubQ Before meals & nightly PRN 07/26/23 1743        Hold Oral hypoglycemics while patient is in the hospital.    Atrial fibrillation with RVR  Persistent Afib w/ RVR on rate control with metoprolol at home  Continue metoprolol tartrate 25mg BID- Uptitrate as needed.  Would allow some permissive tachycardia given severe sepsis but should attempt to keep rate <120  Starting therapeutic lovenox for stroke reduction- discussed risks/benefits with patient and they consent to AC      VTE Risk Mitigation (From admission, onward)         Ordered     enoxaparin injection 120 mg  Every 12 hours         07/26/23 1947     Reason for No Pharmacological VTE Prophylaxis  Once        Question:  Reasons:  Answer:  Physician Provided (leave comment)    07/26/23 1743     IP VTE HIGH RISK PATIENT  Once         07/26/23 1743     Place sequential compression device  Until discontinued         07/26/23 1743                Discharge Planning   STEFAN:      Code Status: Full Code   Is the patient medically ready for discharge?:     Reason for patient still in hospital (select all that apply): Treatment  Discharge Plan A: Home with family                  Gómez Arreguin MD  Department of Hospital Medicine   [facility] Intensive Care

## 2023-07-28 NOTE — ASSESSMENT & PLAN NOTE
Moderate hyponatremia- when corrected for hyperglycemia sodium is 128-129- Improved with IVF  Unclear etiology- F/U serum osm, urine osm, urine sodium  Most likely etiology at this point is hypovolemic  Given 1L NS in ED

## 2023-07-28 NOTE — PLAN OF CARE
07/28/23 1444   Medicare Message   Important Message from Medicare regarding Discharge Appeal Rights Given to patient/caregiver;Explained to patient/caregiver;Signed/date by patient/caregiver   Date IMM was signed 07/28/23   Time IMM was signed [id number]

## 2023-07-28 NOTE — ASSESSMENT & PLAN NOTE
Patient's FSGs are uncontrolled due to hyperglycemia on current medication regimen.  Last A1c reviewed-   Lab Results   Component Value Date    HGBA1C 9.2 (H) 07/26/2023     Most recent fingerstick glucose reviewed-   Recent Labs   Lab 07/27/23  2042 07/28/23  0208 07/28/23  0709 07/28/23  1115   POCTGLUCOSE 280* 183* 221* 278*     Current correctional scale  Low  Maintain anti-hyperglycemic dose as follows-   Antihyperglycemics (From admission, onward)    Start     Stop Route Frequency Ordered    07/28/23 2100  insulin detemir U-100 (Levemir) pen 15 Units         -- SubQ Nightly 07/28/23 0719    07/26/23 1842  insulin aspart U-100 pen 0-5 Units         -- SubQ Before meals & nightly PRN 07/26/23 1743        Hold Oral hypoglycemics while patient is in the hospital.

## 2023-07-28 NOTE — PLAN OF CARE
[facility] - Intensive Care  Discharge Reassessment    Primary Care Provider: [facility] [place]    Expected Discharge Date:   Patient resting in bed with spouse at bedside. He hopes he goes home this weekend as his wife's birthday is Sunday. Dr Kramer recently saw him & plan on doing MRI of his foot today. He has all the equipment he would need at home in case he gets discharged this weekend. Will continue to follow.        Reassessment (most recent)       Discharge Reassessment - 07/28/23 1448          Discharge Reassessment    Assessment Type Discharge Planning Reassessment     Did the patient's condition or plan change since previous assessment? No     Discharge Plan discussed with: Patient     Communicated STEFAN with patient/caregiver No     Discharge Plan A Home with family     DME Needed Upon Discharge  none     Transition of Care Barriers None     Why the patient remains in the hospital Requires continued medical care        Post-Acute Status    Discharge Delays None known at this time

## 2023-07-28 NOTE — CONSULTS
Consults  [facility] Intensive Care  Podiatry  H&P    Patient Name: Bubba Gtz  MRN: [id number]  Admission Date: 7/26/2023  Attending Physician: Gómez Arreguin MD  Primary Care Provider: [facility] [place]     Subjective:     History of Present Illness:  Patient presents for initial inpatient consultation he relates he is a diabetic x3 years he states that about 2 months ago he was in the pool doing his exercises when he developed a wound on the bottom of his left foot.  Patient does relate some degree of neuropathy.  Patient states his foot has gotten progressively worse over the past 2 weeks he was in the pool again last week even with the wound site.  Patient's wife states that his foot became so bad that he could barely stand to walk he was in so much pain.    Scheduled Meds:   enoxparin  1 mg/kg Subcutaneous Q12H (prophylaxis, 0900/2100)    folic acid  1 mg Oral Daily    insulin detemir U-100  5 Units Subcutaneous QHS    metoprolol tartrate  25 mg Oral BID    piperacillin-tazobactam (Zosyn) IV (PEDS and ADULTS) (extended infusion is not appropriate)  4.5 g Intravenous Q8H    thiamine  100 mg Oral Daily    vancomycin (VANCOCIN) IV (PEDS and ADULTS)  1,500 mg Intravenous Q12H     Continuous Infusions:   lactated ringers 100 mL/hr at 07/27/23 0539     PRN Meds:acetaminophen, glucagon (human recombinant), glucose, glucose, insulin aspart U-100, morphine, naloxone, ondansetron, oxyCODONE, prochlorperazine, sodium chloride 0.9%, trazodone    Review of patient's allergies indicates:  No Known Allergies     Past Medical History:   Diagnosis Date    A-fib     Diabetes mellitus, type 2     Hypertension     Primary osteoarthritis of left knee 9/19/2022     Past Surgical History:   Procedure Laterality Date    JOINT REPLACEMENT  2013    Knee    KNEE ARTHROSCOPY      TOTAL KNEE ARTHROPLASTY Left 9/19/2022    Procedure: LEFT ARTHROPLASTY, KNEE, TOTAL;  Surgeon: Ludwin Palacio DO;  Location:  [facility] OR;  Service: Orthopedics;  Laterality: Left;       Family History       Problem Relation (Age of Onset)    Diabetes Mother          Tobacco Use    Smoking status: Never    Smokeless tobacco: Never   Substance and Sexual Activity    Alcohol use: Yes    Drug use: Never    Sexual activity: Not on file     Review of Systems   Musculoskeletal:  Positive for arthralgias, gait problem and joint swelling.   Skin:  Positive for color change and wound.   Neurological:  Positive for numbness.   All other systems reviewed and are negative.  Objective:     Vital Signs (Most Recent):  Temp: 98 °F (36.7 °C) (07/27/23 0715)  Pulse: (!) 117 (07/27/23 1800)  Resp: (!) 28 (07/27/23 1800)  BP: 109/76 (07/27/23 0715)  SpO2: (!) 80 % (07/27/23 1800) Vital Signs (24h Range):  Temp:  [98 °F (36.7 °C)-99.3 °F (37.4 °C)] 98 °F (36.7 °C)  Pulse:  [] 117  Resp:  [12-28] 28  SpO2:  [80 %-98 %] 80 %  BP: ()/(60-76) 109/76     Weight: 129 kg (284 lb 6.3 oz)  Body mass index is 38.57 kg/m².    Foot Exam    Right Foot/Ankle     Neurovascular  Dorsalis pedis: 1+  Posterior tibial: 0      Left Foot/Ankle      Inspection and Palpation  Skin Exam: blister, maceration, ulcer and erythema;     Neurovascular  Dorsalis pedis: 1+  Posterior tibial: 0      Physical Exam  Vitals and nursing note reviewed.   Constitutional:       Appearance: Normal appearance.   Cardiovascular:      Pulses:           Dorsalis pedis pulses are 1+ on the right side and 1+ on the left side.        Posterior tibial pulses are 0 on the right side and 0 on the left side.   Pulmonary:      Effort: Pulmonary effort is normal.   Musculoskeletal:         General: Swelling, tenderness and deformity present.      Left lower leg: Edema present.      Left foot: Decreased range of motion. Deformity present.        Feet:    Feet:      Right foot:      Protective Sensation: 4 sites tested.   1 site sensed.     Left foot:      Protective Sensation: 4 sites tested.   1 site  sensed.     Skin integrity: Ulcer, blister, skin breakdown, erythema and warmth present.   Skin:     Capillary Refill: Capillary refill takes more than 3 seconds.      Findings: Erythema present.   Neurological:      Mental Status: He is alert.      Sensory: Sensory deficit present.   Psychiatric:         Mood and Affect: Mood normal.         Behavior: Behavior normal.         Laboratory:  All pertinent labs reviewed within the last 24 hours.    Diagnostic Results:  I have reviewed all pertinent imaging results/findings within the past 24 hours.    Clinical Findings:                    Assessment/Plan:     Active Diagnoses:    Diagnosis Date Noted POA    PRINCIPAL PROBLEM:  Severe sepsis [A41.9, R65.20] 07/26/2023 Yes    Abscess of bursa of left foot [M71.072] 07/27/2023 Yes    Ulcer of left foot with muscle involvement without evidence of necrosis [L97.525] 07/27/2023 Yes    Alcohol use [Z78.9] 07/26/2023 Yes    Chronic hyponatremia [E87.1] 07/26/2023 Yes    Atrial fibrillation with RVR [I48.91] 09/19/2022 Yes    Non-insulin dependent type 2 diabetes mellitus [E11.9] 09/19/2022 Yes      Problems Resolved During this Admission:     Patient presents for initial inpatient consultation he relates he is a diabetic x3 years he states that about 2 months ago he was in the pool doing his exercises when he developed a wound on the bottom of his left foot.  Patient does relate some degree of neuropathy.  Patient states his foot has gotten progressively worse over the past 2 weeks he was in the pool again last week even with the wound site.  Patient's wife states that his foot became so bad that he could barely stand to walk he was in so much pain.  A comprehensive new patient diabetic evaluation was performed patient does have diabetic related neuropathy bilateral with loss of protective sensation when tested with a 5.07 monofilament.  Patient has no current skin breaks no signs of infection on the right foot evaluation of  the left foot reveals an ulceration sub 1st MPJ left that is approximately 2.5 cm in diameter there is a centralized opening with purulent drainage emitting from the area.  Additionally patient has a notable abscess with discoloration overlying the medial eminence of the 1st MPJ right excisional debridement utilizing a 10. Scalpel removing nonviable skin tissue necrotic tissue tissue slough was carried down to the subcutaneous layer there was approximately 10 cc of purulent drainage that emitted from the area with underlying necrotic tissue.  Following debridement of this area in addition to debridement of the ulceration sub 1st MPJ the area was flushed irrigated with copious amounts of sterile saline.  The area of abscess was approximately 2 cm in diameter that required debridement overlying the medial eminence 1st MPJ left.  I did not perform culture and sensitivity culture and sensitivity has already been performed and is currently pending the patient is currently on Zosyn and vancomycin which are good choices at this point antibiotics will be adjusted pending culture and sensitivity.  Following irrigation of the area a well-padded thick protective dressing was applied I did review the patient's x-rays I have ordered an MRI of the patient's left foot I would prefer the MRI over a CT if it is able to be done with the patient's left knee implant if not a CT will be the next choice.  Patient advised he is going to need to keep the dressing dry and intact stay off of the foot I will follow up with the patient tomorrow he may require further surgical intervention but this is an initial debridement to open the area up and drain the abscess both the patient and the patient's wife for in understanding and agreement with everything discussed today.  Total face-to-face time including discussion evaluation treatment discussion of treatment options treatment plan wound debridement wound care review of the patient's chart as  well as documentation of today's visit and discussion of treatment plan equaled 60 minutes.This note was created using freee voice recognition software that occasionally misinterpreted phrases or words.       Amauri Kramer DPM  Podiatry  [facility]

## 2023-07-28 NOTE — PROGRESS NOTES
[facility] - Intensive Care  Podiatry  Progress Note    Patient Name: Bubba Gtz  MRN: [id number]  Admission Date: 7/26/2023  Hospital Length of Stay: 2 days  Attending Physician: Gómez Arreguin MD  Primary Care Provider: [facility] [place]     Subjective:     Interval History:  Patient presents follow-up inpatient consultation due to severe infection involving the patient's left foot.  Patient states he is having less pain and discomfort although the patient does have diabetic related neuropathy.        Scheduled Meds:   enoxparin  1 mg/kg Subcutaneous Q12H (prophylaxis, 0900/2100)    folic acid  1 mg Oral Daily    insulin detemir U-100  15 Units Subcutaneous QHS    metoprolol tartrate  25 mg Oral BID    mupirocin   Nasal BID    piperacillin-tazobactam (Zosyn) IV (PEDS and ADULTS) (extended infusion is not appropriate)  4.5 g Intravenous Q8H    thiamine  100 mg Oral Daily    vancomycin (VANCOCIN) IV (PEDS and ADULTS)  1,500 mg Intravenous Q12H     Continuous Infusions:  PRN Meds:acetaminophen, glucagon (human recombinant), glucose, glucose, insulin aspart U-100, morphine, naloxone, ondansetron, oxyCODONE, prochlorperazine, sodium chloride 0.9%, trazodone    Review of Systems  Objective:     Vital Signs (Most Recent):  Temp: 99.1 °F (37.3 °C) (07/28/23 1117)  Pulse: 97 (07/28/23 1117)  Resp: 16 (07/28/23 1117)  BP: 119/80 (07/28/23 1117)  SpO2: 97 % (07/28/23 1117) Vital Signs (24h Range):  Temp:  [98.2 °F (36.8 °C)-100.8 °F (38.2 °C)] 99.1 °F (37.3 °C)  Pulse:  [] 97  Resp:  [16-28] 16  SpO2:  [80 %-99 %] 97 %  BP: (101-136)/(62-83) 119/80     Weight: 129 kg (284 lb 6.3 oz)  Body mass index is 38.57 kg/m².      Foot Exam     Right Foot/Ankle      Neurovascular  Dorsalis pedis: 1+  Posterior tibial: 0        Left Foot/Ankle       Inspection and Palpation  Skin Exam: blister, maceration, ulcer and erythema;      Neurovascular  Dorsalis pedis: 1+  Posterior tibial: 0        Physical  Exam  Vitals and nursing note reviewed.   Constitutional:       Appearance: Normal appearance.   Cardiovascular:      Pulses:           Dorsalis pedis pulses are 1+ on the right side and 1+ on the left side.        Posterior tibial pulses are 0 on the right side and 0 on the left side.   Pulmonary:      Effort: Pulmonary effort is normal.   Musculoskeletal:         General: Swelling, tenderness and deformity present.      Left lower leg: Edema present.      Left foot: Decreased range of motion. Deformity present.        Feet:    Feet:      Right foot:      Protective Sensation: 4 sites tested.   1 site sensed.     Left foot:      Protective Sensation: 4 sites tested.   1 site sensed.     Skin integrity: Ulcer, blister, skin breakdown, erythema and warmth present.   Skin:     Capillary Refill: Capillary refill takes more than 3 seconds.      Findings: Erythema present.   Neurological:      Mental Status: He is alert.      Sensory: Sensory deficit present.   Psychiatric:         Mood and Affect: Mood normal.         Behavior: Behavior normal.         Laboratory:  All pertinent labs reviewed within the last 24 hours.    Diagnostic Results:  I have reviewed all pertinent imaging results/findings within the past 24 hours.    Clinical Findings:              Assessment/Plan:     Active Diagnoses:    Diagnosis Date Noted POA    PRINCIPAL PROBLEM:  Severe sepsis [A41.9, R65.20] 07/26/2023 Yes    Abscess of bursa of left foot [M71.072] 07/27/2023 Yes    Ulcer of left foot with muscle involvement without evidence of necrosis [L97.525] 07/27/2023 Yes    Alcohol use [Z78.9] 07/26/2023 Yes    Chronic hyponatremia [E87.1] 07/26/2023 Yes    Atrial fibrillation with RVR [I48.91] 09/19/2022 Yes    Non-insulin dependent type 2 diabetes mellitus [E11.9] 09/19/2022 Yes      Problems Resolved During this Admission:     Patient presents follow-up inpatient consultation due to severe infection involving the patient's left foot.  Patient  states he is having less pain and discomfort although the patient does have diabetic related neuropathy.  Patient is currently on Zosyn and vancomycin which I feel are good choices pending culture and sensitivity results his antibiotics will be adjusted accordingly.  Patient continues to have heavy drainage on his dressing which is dry and intact the patient does have some nonviable tissue where the abscess was drained and debrided on the medial aspect of the 1st MPJ left the ulceration on the plantar aspect of the sub 1st MPJ left continues to display purulent drainage I did non excisionally debride the area with a 10. Scalpel opening the area up the open area is now currently 1 cm in diameter it does track down to the level of bone.  Both wound sites were thoroughly flushed irrigated with Dakin solution I used an 18 gauge Jelco tip and a syringe to do a deep flush on the ulceration sub 1st MPJ left I then applied a Dakin solution wet-to-dry dressing to both these areas with a well-padded thick protective dressing I am awaiting MRI hopefully to be done today to determine whether not the patient has osteomyelitis it is concerning for osteomyelitis while not noted on plain film x-ray the wound and infection does track down to the level of bone.  I did discuss at length and in detail with the patient the patient's wife current treatment plan potential treatment options I have not ruled out the possibility of surgical intervention pending the MRI results we can further discuss this.  The patient's WBCs have improved considerably since his admission.  WBCs on admission were 19.53 yesterday they were 13.36 and today they are 9.85 clearly debridement draining the abscess and antibiotic therapy has been helpful patient still has considerable edema and cellulitis of the left foot but there are signs of improvement.  Wound care was provided total face-to-face time including discussion evaluation discussion of treatment  options treatment plan review of the patient's chart including lab work as well as non excisional debridement of the wound equaled 40 minutes.  Will continue to follow on days that I do not see the patient over the weekend nursing will change the patient's dressing.  I will put in orders when appropriate for dressing change as necessary.This note was created using ProtoStar voice recognition software that occasionally misinterpreted phrases or words.       Amauri Kramer DPM  Podiatry  [place] - Intensive [facility]

## 2023-07-28 NOTE — ASSESSMENT & PLAN NOTE
This patient does have evidence of infective focus  My overall impression is sepsis.  Source: Skin and Soft Tissue (location diabetic foot wound left foot)  Antibiotics given-   Antibiotics (72h ago, onward)    Start     Stop Route Frequency Ordered    07/28/23 0900  mupirocin 2 % ointment         08/02 0859 Nasl 2 times daily 07/28/23 0725    07/27/23 0600  vancomycin (VANCOCIN) 1,500 mg in dextrose 5 % (D5W) 250 mL IVPB  (vancomycin IVPB (PEDS and ADULTS))         -- IV Every 12 hours (non-standard times) 07/26/23 1750    07/26/23 1830  piperacillin-tazobactam (ZOSYN) 4.5 g in dextrose 5 % in water (D5W) 5 % 100 mL IVPB (MB+)         -- IV Every 8 hours (non-standard times) 07/26/23 1730        Latest lactate reviewed-  Recent Labs   Lab 07/26/23  1655 07/26/23  2045   LACTATE 1.5 1.9     Organ dysfunction indicated by Afib w/ RVR    Fluid challenge Not needed - patient is not hypotensive      Post- resuscitation assessment Yes Perfusion exam was performed within 6 hours of septic shock presentation after bolus shows Adequate tissue perfusion assessed by non-invasive monitoring       Will Not start Pressors- Levophed for MAP of 65  Source control achieved by:   Microbiology Results (last 7 days)     Procedure Component Value Units Date/Time    Culture, Anaerobe [[id number]] Collected: 07/26/23 1816    Order Status: Completed Specimen: Wound from Foot, Left Updated: 07/28/23 0749     Anaerobic Culture Culture in progress    Aerobic culture [[id number]]  (Abnormal) Collected: 07/26/23 1816    Order Status: Completed Specimen: Wound from Foot, Left Updated: 07/28/23 0705     Aerobic Bacterial Culture GRAM NEGATIVE BEVERLY  Many  Identification and susceptibility pending  Skin mellisa also present      Blood culture x two cultures. Draw prior to antibiotics. [[id number]] Collected: 07/26/23 1655    Order Status: Completed Specimen: Blood from Peripheral, Antecubital, Left Updated: 07/28/23 0115     Blood Culture, Routine No  Growth to date    Narrative:      Aerobic and anaerobic    Blood culture x two cultures. Draw prior to antibiotics. [[id number]] Collected: 07/26/23 1726    Order Status: Completed Specimen: Blood from Peripheral, Lower Arm, Right Updated: 07/27/23 2145     Blood Culture, Routine No Growth to date    Narrative:      Aerobic and anaerobic        Continue IV abx  F/U cultures  Consulted podiatry  Apply skin marker to borders of cellulitis and elevate foot  Pictures in chart  Xray completed and results reviewed  MRI ordered  q6h neurovascular checks

## 2023-07-28 NOTE — HOSPITAL COURSE
Patient admitted for severe sepsis w/ Afib w/ RVR 2/2 Left foot diabetic foot ulcer. Started on broad spectrum abx. Rates controlled with PO lopressor. Asymptomatic chronic hyponatremia that improved with IVF. Podiatry consulted. Bedside debridement performed. MRI showed no evidence of osteomyelitis so surgical intervention deferred for this current time. Wound cultures with pseudomonas, morganella. Podiatry cleared patient for dsicharge. He was placed on PO abx. Discharge instructions and return precautions provided.

## 2023-07-29 LAB
ALBUMIN SERPL BCP-MCNC: 2.5 G/DL (ref 3.5–5.2)
ALP SERPL-CCNC: 58 U/L (ref 55–135)
ALT SERPL W/O P-5'-P-CCNC: 42 U/L (ref 10–44)
ANION GAP SERPL CALC-SCNC: 10 MMOL/L (ref 8–16)
AST SERPL-CCNC: 31 U/L (ref 10–40)
BASOPHILS # BLD AUTO: 0.06 K/UL (ref 0–0.2)
BASOPHILS NFR BLD: 0.7 % (ref 0–1.9)
BILIRUB SERPL-MCNC: 1 MG/DL (ref 0.1–1)
BUN SERPL-MCNC: 7 MG/DL (ref 8–23)
CALCIUM SERPL-MCNC: 8.6 MG/DL (ref 8.7–10.5)
CHLORIDE SERPL-SCNC: 100 MMOL/L (ref 95–110)
CO2 SERPL-SCNC: 23 MMOL/L (ref 23–29)
CREAT SERPL-MCNC: 0.8 MG/DL (ref 0.5–1.4)
DIFFERENTIAL METHOD: ABNORMAL
EOSINOPHIL # BLD AUTO: 0.3 K/UL (ref 0–0.5)
EOSINOPHIL NFR BLD: 3.8 % (ref 0–8)
ERYTHROCYTE [DISTWIDTH] IN BLOOD BY AUTOMATED COUNT: 12.1 % (ref 11.5–14.5)
EST. GFR  (NO RACE VARIABLE): >60 ML/MIN/1.73 M^2
GLUCOSE SERPL-MCNC: 168 MG/DL (ref 70–110)
HCT VFR BLD AUTO: 31.2 % (ref 40–54)
HGB BLD-MCNC: 11.1 G/DL (ref 14–18)
IMM GRANULOCYTES # BLD AUTO: 0.04 K/UL (ref 0–0.04)
IMM GRANULOCYTES NFR BLD AUTO: 0.5 % (ref 0–0.5)
LYMPHOCYTES # BLD AUTO: 2 K/UL (ref 1–4.8)
LYMPHOCYTES NFR BLD: 23.3 % (ref 18–48)
MAGNESIUM SERPL-MCNC: 2 MG/DL (ref 1.6–2.6)
MCH RBC QN AUTO: 31.7 PG (ref 27–31)
MCHC RBC AUTO-ENTMCNC: 35.6 G/DL (ref 32–36)
MCV RBC AUTO: 89 FL (ref 82–98)
MONOCYTES # BLD AUTO: 0.9 K/UL (ref 0.3–1)
MONOCYTES NFR BLD: 9.8 % (ref 4–15)
NEUTROPHILS # BLD AUTO: 5.4 K/UL (ref 1.8–7.7)
NEUTROPHILS NFR BLD: 61.9 % (ref 38–73)
NRBC BLD-RTO: 0 /100 WBC
PHOSPHATE SERPL-MCNC: 3.1 MG/DL (ref 2.7–4.5)
PLATELET # BLD AUTO: 248 K/UL (ref 150–450)
PMV BLD AUTO: 9.9 FL (ref 9.2–12.9)
POCT GLUCOSE: 174 MG/DL (ref 70–110)
POCT GLUCOSE: 189 MG/DL (ref 70–110)
POCT GLUCOSE: 198 MG/DL (ref 70–110)
POCT GLUCOSE: 221 MG/DL (ref 70–110)
POCT GLUCOSE: 253 MG/DL (ref 70–110)
POTASSIUM SERPL-SCNC: 3.8 MMOL/L (ref 3.5–5.1)
PROT SERPL-MCNC: 6.6 G/DL (ref 6–8.4)
RBC # BLD AUTO: 3.5 M/UL (ref 4.6–6.2)
SODIUM SERPL-SCNC: 133 MMOL/L (ref 136–145)
WBC # BLD AUTO: 8.67 K/UL (ref 3.9–12.7)

## 2023-07-29 PROCEDURE — 99233 SBSQ HOSP IP/OBS HIGH 50: CPT | Mod: ,,, | Performed by: PODIATRIST

## 2023-07-29 PROCEDURE — 25000003 PHARM REV CODE 250: Performed by: STUDENT IN AN ORGANIZED HEALTH CARE EDUCATION/TRAINING PROGRAM

## 2023-07-29 PROCEDURE — 83735 ASSAY OF MAGNESIUM: CPT | Performed by: STUDENT IN AN ORGANIZED HEALTH CARE EDUCATION/TRAINING PROGRAM

## 2023-07-29 PROCEDURE — 99233 SBSQ HOSP IP/OBS HIGH 50: CPT | Mod: ,,, | Performed by: FAMILY MEDICINE

## 2023-07-29 PROCEDURE — 84100 ASSAY OF PHOSPHORUS: CPT | Performed by: STUDENT IN AN ORGANIZED HEALTH CARE EDUCATION/TRAINING PROGRAM

## 2023-07-29 PROCEDURE — 63600175 PHARM REV CODE 636 W HCPCS: Performed by: PODIATRIST

## 2023-07-29 PROCEDURE — 99233 PR SUBSEQUENT HOSPITAL CARE,LEVL III: ICD-10-PCS | Mod: ,,, | Performed by: FAMILY MEDICINE

## 2023-07-29 PROCEDURE — 63600175 PHARM REV CODE 636 W HCPCS: Performed by: STUDENT IN AN ORGANIZED HEALTH CARE EDUCATION/TRAINING PROGRAM

## 2023-07-29 PROCEDURE — 80053 COMPREHEN METABOLIC PANEL: CPT | Performed by: STUDENT IN AN ORGANIZED HEALTH CARE EDUCATION/TRAINING PROGRAM

## 2023-07-29 PROCEDURE — 21400001 HC TELEMETRY ROOM

## 2023-07-29 PROCEDURE — 99233 PR SUBSEQUENT HOSPITAL CARE,LEVL III: ICD-10-PCS | Mod: ,,, | Performed by: PODIATRIST

## 2023-07-29 PROCEDURE — 85025 COMPLETE CBC W/AUTO DIFF WBC: CPT | Performed by: STUDENT IN AN ORGANIZED HEALTH CARE EDUCATION/TRAINING PROGRAM

## 2023-07-29 RX ORDER — CIPROFLOXACIN 2 MG/ML
400 INJECTION, SOLUTION INTRAVENOUS
Status: DISCONTINUED | OUTPATIENT
Start: 2023-07-29 | End: 2023-07-31 | Stop reason: HOSPADM

## 2023-07-29 RX ADMIN — VANCOMYCIN HYDROCHLORIDE 1500 MG: 1.5 INJECTION, POWDER, LYOPHILIZED, FOR SOLUTION INTRAVENOUS at 06:07

## 2023-07-29 RX ADMIN — INSULIN ASPART 2 UNITS: 100 INJECTION, SOLUTION INTRAVENOUS; SUBCUTANEOUS at 12:07

## 2023-07-29 RX ADMIN — ENOXAPARIN SODIUM 120 MG: 120 INJECTION SUBCUTANEOUS at 09:07

## 2023-07-29 RX ADMIN — CIPROFLOXACIN 400 MG: 2 INJECTION, SOLUTION INTRAVENOUS at 11:07

## 2023-07-29 RX ADMIN — Medication 100 MG: at 09:07

## 2023-07-29 RX ADMIN — PIPERACILLIN AND TAZOBACTAM 4.5 G: 4; .5 INJECTION, POWDER, LYOPHILIZED, FOR SOLUTION INTRAVENOUS; PARENTERAL at 09:07

## 2023-07-29 RX ADMIN — PIPERACILLIN AND TAZOBACTAM 4.5 G: 4; .5 INJECTION, POWDER, LYOPHILIZED, FOR SOLUTION INTRAVENOUS; PARENTERAL at 05:07

## 2023-07-29 RX ADMIN — METOPROLOL TARTRATE 25 MG: 25 TABLET, FILM COATED ORAL at 09:07

## 2023-07-29 RX ADMIN — PIPERACILLIN AND TAZOBACTAM 4.5 G: 4; .5 INJECTION, POWDER, LYOPHILIZED, FOR SOLUTION INTRAVENOUS; PARENTERAL at 02:07

## 2023-07-29 RX ADMIN — MUPIROCIN: 20 OINTMENT TOPICAL at 09:07

## 2023-07-29 RX ADMIN — INSULIN ASPART 3 UNITS: 100 INJECTION, SOLUTION INTRAVENOUS; SUBCUTANEOUS at 05:07

## 2023-07-29 RX ADMIN — OXYCODONE HYDROCHLORIDE 5 MG: 5 TABLET ORAL at 09:07

## 2023-07-29 RX ADMIN — VANCOMYCIN HYDROCHLORIDE 1500 MG: 1.5 INJECTION, POWDER, LYOPHILIZED, FOR SOLUTION INTRAVENOUS at 05:07

## 2023-07-29 RX ADMIN — FOLIC ACID 1 MG: 1 TABLET ORAL at 09:07

## 2023-07-29 NOTE — PROGRESS NOTES
[facility] Medicine  Progress Note    Patient Name: Bubba Gtz  MRN: [id number]  Patient Class: IP- Inpatient   Admission Date: 7/26/2023  Length of Stay: 3 days  Attending Physician: Gómez Arreguin MD  Primary Care Provider: [facility] [place]        Subjective:     Principal Problem:Severe sepsis        HPI:  Patient with a hx of NIDDM2, Afib not on AC, Etoh Abuse presenting with left foot pain. 2 months ago patient was cleaning public pool with a scrubber and scrapped left foot on the pool floor. Blister developed after that. This popped. Wound did not heal and worsened over two months. He has noticed increased swelling, redness, pain in foot over past 2-3 weeks. Endorses some fever and chills. No NVD. Patient states he drinks 2 shots per day and has done this for a long time.       Overview/Hospital Course:  Patient admitted for severe sepsis w/ Afib w/ RVR 2/2 Left foot diabetic foot ulcer. Started on broad spectrum abx. Rates controlled with PO lopressor. Asymptomatic chronic hyponatremia that improved with IVF. Podiatry consulted. Bedside debridement performed. MRI showed no evidence of osteomyelitis so surgical intervention deferred for this current time. Wound cultures pending.       Interval History: no acute events    Review of Systems  Objective:     Vital Signs (Most Recent):  Temp: 96.1 °F (35.6 °C) (07/29/23 1644)  Pulse: 100 (07/29/23 1644)  Resp: 14 (07/29/23 1644)  BP: 131/82 (07/29/23 1644)  SpO2: 97 % (07/29/23 1644) Vital Signs (24h Range):  Temp:  [96.1 °F (35.6 °C)-99.8 °F (37.7 °C)] 96.1 °F (35.6 °C)  Pulse:  [] 100  Resp:  [14-18] 14  SpO2:  [94 %-98 %] 97 %  BP: (122-140)/(68-96) 131/82     Weight: 129 kg (284 lb 6.3 oz)  Body mass index is 38.57 kg/m².    Intake/Output Summary (Last 24 hours) at 7/29/2023 1716  Last data filed at 7/29/2023 1107  Gross per 24 hour   Intake 1427.19 ml   Output 1800 ml   Net -372.81 ml          Physical Exam  Vitals reviewed.   Constitutional:       General: He is not in acute distress.     Appearance: He is not toxic-appearing or diaphoretic.   Cardiovascular:      Rate and Rhythm: Regular rhythm. Tachycardia present.      Pulses: Normal pulses.      Heart sounds: No murmur heard.     No gallop.   Pulmonary:      Effort: Pulmonary effort is normal.   Musculoskeletal:      Comments: Left foot with improvement in swelling/erythema. Dressing with purulent and serosanguinous discharge.   Skin:     General: Skin is warm and dry.   Neurological:      Mental Status: He is alert.             Significant Labs: All pertinent labs within the past 24 hours have been reviewed.  CBC:   Recent Labs   Lab 07/28/23 0423 07/29/23 0452   WBC 9.85 8.67   HGB 11.0* 11.1*   HCT 31.8* 31.2*    248     CMP:   Recent Labs   Lab 07/28/23 0423 07/29/23 0452   * 133*   K 3.9 3.8   CL 95 100   CO2 23 23   * 168*   BUN 9 7*   CREATININE 1.0 0.8   CALCIUM 8.2* 8.6*   PROT 6.8 6.6   ALBUMIN 2.6* 2.5*   BILITOT 0.9 1.0   ALKPHOS 58 58   AST 32 31   ALT 35 42   ANIONGAP 13 10       Significant Imaging: I have reviewed all pertinent imaging results/findings within the past 24 hours.  MRI Foot (Forefoot) Left Without Contrast   Final Result      Ulceration and subcutaneous air the plantar surface of the 1st MTP joint without underlying osteomyelitis.      Neuropathic osteo arthropathy of the midfoot.         Electronically signed by: Linda Meehan   Date:    07/28/2023   Time:    17:11      X-Ray Foot Complete Left   Final Result      Extensive abnormalities throughout the midfoot, most likely on the basis of neuropathic joint formation in this diabetic patient.         Electronically signed by: Marion Heredia   Date:    07/26/2023   Time:    17:01      X-Ray Chest AP Portable   Final Result      No acute process.         Electronically signed by: Shai Mendez MD   Date:    07/26/2023   Time:    16:59               Assessment/Plan:      * Severe sepsis  This patient does have evidence of infective focus  My overall impression is sepsis.  Source: Skin and Soft Tissue (location diabetic foot wound left foot)  Antibiotics given-   Antibiotics (72h ago, onward)    Start     Stop Route Frequency Ordered    07/28/23 0900  mupirocin 2 % ointment         08/02/23 0859 Nasl 2 times daily 07/28/23 0725    07/27/23 0600  vancomycin (VANCOCIN) 1,500 mg in dextrose 5 % (D5W) 250 mL IVPB  (vancomycin IVPB (PEDS and ADULTS))         -- IV Every 12 hours (non-standard times) 07/26/23 1750    07/26/23 1830  piperacillin-tazobactam (ZOSYN) 4.5 g in dextrose 5 % in water (D5W) 5 % 100 mL IVPB (MB+)         -- IV Every 8 hours (non-standard times) 07/26/23 1730        Latest lactate reviewed-  Recent Labs   Lab 07/26/23  2045   LACTATE 1.9     Organ dysfunction indicated by Afib w/ RVR    Fluid challenge Not needed - patient is not hypotensive      Post- resuscitation assessment Yes Perfusion exam was performed within 6 hours of septic shock presentation after bolus shows Adequate tissue perfusion assessed by non-invasive monitoring       Will Not start Pressors- Levophed for MAP of 65  Source control achieved by:   Microbiology Results (last 7 days)     Procedure Component Value Units Date/Time    Blood culture x two cultures. Draw prior to antibiotics. [[id number]] Collected: 07/26/23 1726    Order Status: Completed Specimen: Blood from Peripheral, Lower Arm, Right Updated: 07/29/23 1612     Blood Culture, Routine No Growth to date      No Growth to date      No Growth to date    Narrative:      Aerobic and anaerobic    Blood culture x two cultures. Draw prior to antibiotics. [[id number]] Collected: 07/26/23 1655    Order Status: Completed Specimen: Blood from Peripheral, Antecubital, Left Updated: 07/29/23 1612     Blood Culture, Routine No Growth to date      No Growth to date      No Growth to date    Narrative:      Aerobic and  "anaerobic    Aerobic culture [[id number]]  (Abnormal)  (Susceptibility) Collected: 07/26/23 1816    Order Status: Completed Specimen: Wound from Foot, Left Updated: 07/29/23 1138     Aerobic Bacterial Culture MORGANELLA MORGANII  Many        GRAM NEGATIVE BEVERLY  Rare  Identification and susceptibility pending  Skin mellisa also present      Culture, Anaerobe [[id number]] Collected: 07/26/23 1816    Order Status: Completed Specimen: Wound from Foot, Left Updated: 07/28/23 0749     Anaerobic Culture Culture in progress        Continue IV abx  F/U cultures  Consulted podiatry  Apply skin marker to borders of cellulitis and elevate foot  Pictures in chart  Xray completed and results reviewed  MRI reviewed - no evidence of osteomyelitis  q6h neurovascular checks    Ulcer of left foot with muscle involvement without evidence of necrosis  See above      Abscess of bursa of left foot  See above      Chronic hyponatremia  Moderate hyponatremia- when corrected for hyperglycemia sodium is 128-129- Improved with IVF  Unclear etiology- F/U serum osm, urine osm, urine sodium  Most likely etiology at this point is hypovolemic  Given 1L NS in ED        Alcohol use  Patient states he drinks "2 shots" per day  Monitor with PRUDENCIO q4h- call MD if >8  Hold off on ativan for now unless patient starts exhibiting withdrawal symptoms  Thiamine and folic acid  Counseled on cessation      Non-insulin dependent type 2 diabetes mellitus  Patient's FSGs are uncontrolled due to hyperglycemia on current medication regimen.  Last A1c reviewed-   Lab Results   Component Value Date    HGBA1C 9.2 (H) 07/26/2023     Most recent fingerstick glucose reviewed-   Recent Labs   Lab 07/29/23  0204 07/29/23  0710 07/29/23  1118 07/29/23  1638   POCTGLUCOSE 174* 189* 221* 253*     Current correctional scale  Low  Maintain anti-hyperglycemic dose as follows-   Antihyperglycemics (From admission, onward)    Start     Stop Route Frequency Ordered    07/28/23 2100  " insulin detemir U-100 (Levemir) pen 15 Units         -- SubQ Nightly 07/28/23 0719    07/26/23 1842  insulin aspart U-100 pen 0-5 Units         -- SubQ Before meals & nightly PRN 07/26/23 1743        Hold Oral hypoglycemics while patient is in the hospital.    Atrial fibrillation with RVR  Persistent Afib w/ RVR on rate control with metoprolol at home  Continue metoprolol tartrate 25mg BID- Uptitrate as needed.  Would allow some permissive tachycardia given severe sepsis but should attempt to keep rate <120  Starting therapeutic lovenox for stroke reduction- discussed risks/benefits with patient and they consent to AC      VTE Risk Mitigation (From admission, onward)         Ordered     enoxaparin injection 120 mg  Every 12 hours         07/26/23 1947     Reason for No Pharmacological VTE Prophylaxis  Once        Question:  Reasons:  Answer:  Physician Provided (leave comment)    07/26/23 1743     IP VTE HIGH RISK PATIENT  Once         07/26/23 1743     Place sequential compression device  Until discontinued         07/26/23 1743                Discharge Planning   STEFAN:      Code Status: Full Code   Is the patient medically ready for discharge?:     Reason for patient still in hospital (select all that apply): Treatment  Discharge Plan A: Home with family   Discharge Delays: None known at this time              Yael Bae MD  Department of Hospital Medicine   [facility] Intensive Care

## 2023-07-29 NOTE — PROGRESS NOTES
[place] - Intensive Care  Podiatry  Progress Note    Patient Name: Bubba Gtz  MRN: [id number]  Admission Date: 7/26/2023  Hospital Length of Stay: 3 days  Attending Physician: Gómez Arreguin MD  Primary Care Provider: [facility] [place]     Subjective:     Interval History: Patient presents follow-up inpatient consultation due to severe infection involving the patient's left foot.  Patient states he is having less pain and discomfort although the patient does have diabetic related neuropathy.        Scheduled Meds:   enoxparin  1 mg/kg Subcutaneous Q12H (prophylaxis, 0900/2100)    folic acid  1 mg Oral Daily    insulin detemir U-100  15 Units Subcutaneous QHS    metoprolol tartrate  25 mg Oral BID    mupirocin   Nasal BID    piperacillin-tazobactam (Zosyn) IV (PEDS and ADULTS) (extended infusion is not appropriate)  4.5 g Intravenous Q8H    thiamine  100 mg Oral Daily    vancomycin (VANCOCIN) IV (PEDS and ADULTS)  1,500 mg Intravenous Q12H     Continuous Infusions:  PRN Meds:acetaminophen, glucagon (human recombinant), glucose, glucose, insulin aspart U-100, morphine, naloxone, ondansetron, oxyCODONE, prochlorperazine, sodium chloride 0.9%, trazodone    Review of Systems  Objective:     Vital Signs (Most Recent):  Temp: 97.9 °F (36.6 °C) (07/29/23 1114)  Pulse: 98 (07/29/23 1114)  Resp: 16 (07/29/23 1114)  BP: 122/80 (07/29/23 1114)  SpO2: (!) 94 % (07/29/23 1114) Vital Signs (24h Range):  Temp:  [96.7 °F (35.9 °C)-99.8 °F (37.7 °C)] 97.9 °F (36.6 °C)  Pulse:  [] 98  Resp:  [14-18] 16  SpO2:  [94 %-98 %] 94 %  BP: (122-150)/(68-96) 122/80     Weight: 129 kg (284 lb 6.3 oz)  Body mass index is 38.57 kg/m².    Foot Exam  Right Foot/Ankle      Neurovascular  Dorsalis pedis: 1+  Posterior tibial: 0        Left Foot/Ankle       Inspection and Palpation  Skin Exam: blister, maceration, ulcer and erythema;      Neurovascular  Dorsalis pedis: 1+  Posterior tibial: 0        Physical  Exam  Vitals and nursing note reviewed.   Constitutional:       Appearance: Normal appearance.   Cardiovascular:      Pulses:           Dorsalis pedis pulses are 1+ on the right side and 1+ on the left side.        Posterior tibial pulses are 0 on the right side and 0 on the left side.   Pulmonary:      Effort: Pulmonary effort is normal.   Musculoskeletal:         General: Swelling, tenderness and deformity present.      Left lower leg: Edema present.      Left foot: Decreased range of motion. Deformity present.        Feet:    Feet:      Right foot:      Protective Sensation: 4 sites tested.   1 site sensed.     Left foot:      Protective Sensation: 4 sites tested.   1 site sensed.     Skin integrity: Ulcer, blister, skin breakdown, erythema and warmth present.   Skin:     Capillary Refill: Capillary refill takes more than 3 seconds.      Findings: Erythema present.   Neurological:      Mental Status: He is alert.      Sensory: Sensory deficit present.   Psychiatric:         Mood and Affect: Mood normal.         Behavior: Behavior normal.      Laboratory:  All pertinent labs reviewed within the last 24 hours.    Diagnostic Results:  I have reviewed all pertinent imaging results/findings within the past 24 hours.    Clinical Findings:                Assessment/Plan:     Active Diagnoses:    Diagnosis Date Noted POA    PRINCIPAL PROBLEM:  Severe sepsis [A41.9, R65.20] 07/26/2023 Yes    Abscess of bursa of left foot [M71.072] 07/27/2023 Yes    Ulcer of left foot with muscle involvement without evidence of necrosis [L97.525] 07/27/2023 Yes    Alcohol use [Z78.9] 07/26/2023 Yes    Chronic hyponatremia [E87.1] 07/26/2023 Yes    Atrial fibrillation with RVR [I48.91] 09/19/2022 Yes    Non-insulin dependent type 2 diabetes mellitus [E11.9] 09/19/2022 Yes      Problems Resolved During this Admission:     Patient presents follow-up inpatient consultation due to severe infection involving the patient's left foot.  Patient  states he is having less pain and discomfort although the patient does have diabetic related neuropathy.  Patient is currently on Zosyn and vancomycin which I feel are good choices pending culture and sensitivity results his antibiotics will be adjusted accordingly.  Patient continues to have heavy drainage on his dressing which is dry and intact the patient does have some nonviable tissue where the abscess was drained and debrided on the medial aspect of the 1st MPJ left the ulceration on the plantar aspect of the sub 1st MPJ left continues to display purulent drainage I did non excisionally debride the area with a 10. Scalpel opening the area up the open area is now currently 1 cm in diameter it does track down to the level of bone.  Both wound sites were thoroughly flushed irrigated with Dakin solution I used an 18 gauge Jelco tip and a syringe to do a deep flush on the ulceration sub 1st MPJ left I then applied a Dakin solution wet-to-dry dressing to both these areas with a well-padded thick protective dressing.  Orders have been put in for nursing to do the dressing change on days that I do not see the patient.  I do anticipate following up with the patient on Monday morning I advised the patient that would be his most likely earliest discharge time the patient is very anxious to get out of the hospital but I explained to him we need to make sure that this infection is well controlled before discharging him on the appropriate antibiotics.  Culture and sensitivity is still pending preliminary displays Gram-negative.  Patient advised we will have to determine whether not outpatient IV or oral antibiotics are most appropriate at discharge.  I did review MRI results advising the patient there is no sign of bone infection currently I did explain to the patient he has extensive soft tissue infection the ulceration sub 1st MPJ left has improved since I debrided this area there is not heavy purulent drainage emitting  from the area it is relatively dry patient still has nonviable necrotic tissue on the medial eminence of the 1st MPJ left I did further debride the area with minimal drainage I am however concerned as the patient continues to have cellulitis extending into the medial arch left I am concerned there could be some tracking in this area and this is going to need to be monitored closely patient does have overall improvement with noted cellulitis and edema on the dorsal aspect of the left foot.  Total face-to-face time including discussion evaluation treatment non excisional wound debridement wound care discussion of treatment options treatment plan equaled 60 minutes.  I did discuss with the patient today at this time I am not recommending surgical intervention however I have not ruled out the possibility the patient could require surgical intervention or and or amputation if the infection continues or worsens.  I also tried to make the patient understand that he needs to be compliant upon discharge I stressed to the patient the need for him to stay off of the left foot weight-bearing on the left foot is going to inhibit the healing which will likely enable the infection to get worse.  Patient was advised that he is going to have to stay off of his foot once he is discharged the dressing will have to be changed daily per my instructions otherwise this is not going to resolve and he will likely need surgical intervention.  Will follow.This note was created using [employer] voice recognition software that occasionally misinterpreted phrases or words.     Amauri Kramer DPM  Podiatry  [place] - Intensive Care

## 2023-07-29 NOTE — ASSESSMENT & PLAN NOTE
Patient's FSGs are uncontrolled due to hyperglycemia on current medication regimen.  Last A1c reviewed-   Lab Results   Component Value Date    HGBA1C 9.2 (H) 07/26/2023     Most recent fingerstick glucose reviewed-   Recent Labs   Lab 07/29/23  0204 07/29/23  0710 07/29/23  1118 07/29/23  1638   POCTGLUCOSE 174* 189* 221* 253*     Current correctional scale  Low  Maintain anti-hyperglycemic dose as follows-   Antihyperglycemics (From admission, onward)    Start     Stop Route Frequency Ordered    07/28/23 2100  insulin detemir U-100 (Levemir) pen 15 Units         -- SubQ Nightly 07/28/23 0719    07/26/23 1842  insulin aspart U-100 pen 0-5 Units         -- SubQ Before meals & nightly PRN 07/26/23 1743        Hold Oral hypoglycemics while patient is in the hospital.

## 2023-07-29 NOTE — ASSESSMENT & PLAN NOTE
This patient does have evidence of infective focus  My overall impression is sepsis.  Source: Skin and Soft Tissue (location diabetic foot wound left foot)  Antibiotics given-   Antibiotics (72h ago, onward)    Start     Stop Route Frequency Ordered    07/28/23 0900  mupirocin 2 % ointment         08/02/23 0859 Nasl 2 times daily 07/28/23 0725    07/27/23 0600  vancomycin (VANCOCIN) 1,500 mg in dextrose 5 % (D5W) 250 mL IVPB  (vancomycin IVPB (PEDS and ADULTS))         -- IV Every 12 hours (non-standard times) 07/26/23 1750    07/26/23 1830  piperacillin-tazobactam (ZOSYN) 4.5 g in dextrose 5 % in water (D5W) 5 % 100 mL IVPB (MB+)         -- IV Every 8 hours (non-standard times) 07/26/23 1730        Latest lactate reviewed-  Recent Labs   Lab 07/26/23 2045   LACTATE 1.9     Organ dysfunction indicated by Afib w/ RVR    Fluid challenge Not needed - patient is not hypotensive      Post- resuscitation assessment Yes Perfusion exam was performed within 6 hours of septic shock presentation after bolus shows Adequate tissue perfusion assessed by non-invasive monitoring       Will Not start Pressors- Levophed for MAP of 65  Source control achieved by:   Microbiology Results (last 7 days)     Procedure Component Value Units Date/Time    Blood culture x two cultures. Draw prior to antibiotics. [[id number]] Collected: 07/26/23 1726    Order Status: Completed Specimen: Blood from Peripheral, Lower Arm, Right Updated: 07/29/23 1612     Blood Culture, Routine No Growth to date      No Growth to date      No Growth to date    Narrative:      Aerobic and anaerobic    Blood culture x two cultures. Draw prior to antibiotics. [[id number]] Collected: 07/26/23 1655    Order Status: Completed Specimen: Blood from Peripheral, Antecubital, Left Updated: 07/29/23 1612     Blood Culture, Routine No Growth to date      No Growth to date      No Growth to date    Narrative:      Aerobic and anaerobic    Aerobic culture [[id number]]  (Abnormal)   (Susceptibility) Collected: 07/26/23 1816    Order Status: Completed Specimen: Wound from Foot, Left Updated: 07/29/23 1138     Aerobic Bacterial Culture MORGANELLA MORGANII  Many        GRAM NEGATIVE BEVERLY  Rare  Identification and susceptibility pending  Skin mellisa also present      Culture, Anaerobe [[id number]] Collected: 07/26/23 1816    Order Status: Completed Specimen: Wound from Foot, Left Updated: 07/28/23 0749     Anaerobic Culture Culture in progress        Continue IV abx  F/U cultures  Consulted podiatry  Apply skin marker to borders of cellulitis and elevate foot  Pictures in chart  Xray completed and results reviewed  MRI reviewed - no evidence of osteomyelitis  q6h neurovascular checks

## 2023-07-29 NOTE — SUBJECTIVE & OBJECTIVE
Interval History: no acute events    Review of Systems  Objective:     Vital Signs (Most Recent):  Temp: 96.1 °F (35.6 °C) (07/29/23 1644)  Pulse: 100 (07/29/23 1644)  Resp: 14 (07/29/23 1644)  BP: 131/82 (07/29/23 1644)  SpO2: 97 % (07/29/23 1644) Vital Signs (24h Range):  Temp:  [96.1 °F (35.6 °C)-99.8 °F (37.7 °C)] 96.1 °F (35.6 °C)  Pulse:  [] 100  Resp:  [14-18] 14  SpO2:  [94 %-98 %] 97 %  BP: (122-140)/(68-96) 131/82     Weight: 129 kg (284 lb 6.3 oz)  Body mass index is 38.57 kg/m².    Intake/Output Summary (Last 24 hours) at 7/29/2023 1716  Last data filed at 7/29/2023 1107  Gross per 24 hour   Intake 1427.19 ml   Output 1800 ml   Net -372.81 ml         Physical Exam  Vitals reviewed.   Constitutional:       General: He is not in acute distress.     Appearance: He is not toxic-appearing or diaphoretic.   Cardiovascular:      Rate and Rhythm: Regular rhythm. Tachycardia present.      Pulses: Normal pulses.      Heart sounds: No murmur heard.     No gallop.   Pulmonary:      Effort: Pulmonary effort is normal.   Musculoskeletal:      Comments: Left foot with improvement in swelling/erythema. Dressing with purulent and serosanguinous discharge.   Skin:     General: Skin is warm and dry.   Neurological:      Mental Status: He is alert.             Significant Labs: All pertinent labs within the past 24 hours have been reviewed.  CBC:   Recent Labs   Lab 07/28/23 0423 07/29/23 0452   WBC 9.85 8.67   HGB 11.0* 11.1*   HCT 31.8* 31.2*    248     CMP:   Recent Labs   Lab 07/28/23 0423 07/29/23 0452   * 133*   K 3.9 3.8   CL 95 100   CO2 23 23   * 168*   BUN 9 7*   CREATININE 1.0 0.8   CALCIUM 8.2* 8.6*   PROT 6.8 6.6   ALBUMIN 2.6* 2.5*   BILITOT 0.9 1.0   ALKPHOS 58 58   AST 32 31   ALT 35 42   ANIONGAP 13 10       Significant Imaging: I have reviewed all pertinent imaging results/findings within the past 24 hours.  MRI Foot (Forefoot) Left Without Contrast   Final Result       Ulceration and subcutaneous air the plantar surface of the 1st MTP joint without underlying osteomyelitis.      Neuropathic osteo arthropathy of the midfoot.         Electronically signed by: Linda Meehan   Date:    07/28/2023   Time:    17:11      X-Ray Foot Complete Left   Final Result      Extensive abnormalities throughout the midfoot, most likely on the basis of neuropathic joint formation in this diabetic patient.         Electronically signed by: Marion Heredia   Date:    07/26/2023   Time:    17:01      X-Ray Chest AP Portable   Final Result      No acute process.         Electronically signed by: Shai Mendez MD   Date:    07/26/2023   Time:    16:59

## 2023-07-30 PROBLEM — I48.91 ATRIAL FIBRILLATION: Status: ACTIVE | Noted: 2023-07-30

## 2023-07-30 LAB
POCT GLUCOSE: 152 MG/DL (ref 70–110)
POCT GLUCOSE: 158 MG/DL (ref 70–110)
POCT GLUCOSE: 170 MG/DL (ref 70–110)
POCT GLUCOSE: 179 MG/DL (ref 70–110)
POCT GLUCOSE: 204 MG/DL (ref 70–110)

## 2023-07-30 PROCEDURE — 21400001 HC TELEMETRY ROOM

## 2023-07-30 PROCEDURE — 63600175 PHARM REV CODE 636 W HCPCS: Performed by: PODIATRIST

## 2023-07-30 PROCEDURE — 25000003 PHARM REV CODE 250: Performed by: STUDENT IN AN ORGANIZED HEALTH CARE EDUCATION/TRAINING PROGRAM

## 2023-07-30 PROCEDURE — 63600175 PHARM REV CODE 636 W HCPCS: Performed by: STUDENT IN AN ORGANIZED HEALTH CARE EDUCATION/TRAINING PROGRAM

## 2023-07-30 PROCEDURE — 99233 SBSQ HOSP IP/OBS HIGH 50: CPT | Mod: ,,, | Performed by: FAMILY MEDICINE

## 2023-07-30 PROCEDURE — 99233 PR SUBSEQUENT HOSPITAL CARE,LEVL III: ICD-10-PCS | Mod: ,,, | Performed by: FAMILY MEDICINE

## 2023-07-30 RX ADMIN — FOLIC ACID 1 MG: 1 TABLET ORAL at 08:07

## 2023-07-30 RX ADMIN — METOPROLOL TARTRATE 25 MG: 25 TABLET, FILM COATED ORAL at 08:07

## 2023-07-30 RX ADMIN — TRAZODONE HYDROCHLORIDE 25 MG: 50 TABLET ORAL at 08:07

## 2023-07-30 RX ADMIN — Medication 100 MG: at 08:07

## 2023-07-30 RX ADMIN — MUPIROCIN: 20 OINTMENT TOPICAL at 08:07

## 2023-07-30 RX ADMIN — CIPROFLOXACIN 400 MG: 2 INJECTION, SOLUTION INTRAVENOUS at 11:07

## 2023-07-30 RX ADMIN — VANCOMYCIN HYDROCHLORIDE 1500 MG: 1.5 INJECTION, POWDER, LYOPHILIZED, FOR SOLUTION INTRAVENOUS at 05:07

## 2023-07-30 RX ADMIN — ENOXAPARIN SODIUM 120 MG: 120 INJECTION SUBCUTANEOUS at 08:07

## 2023-07-30 RX ADMIN — INSULIN ASPART 2 UNITS: 100 INJECTION, SOLUTION INTRAVENOUS; SUBCUTANEOUS at 12:07

## 2023-07-30 NOTE — PROGRESS NOTES
[facility] Medicine  Progress Note    Patient Name: Bubba Gtz  MRN: [id number]  Patient Class: IP- Inpatient   Admission Date: 7/26/2023  Length of Stay: 4 days  Attending Physician: Gómez Arreguin MD  Primary Care Provider: [facility] [place]        Subjective:     Principal Problem:Severe sepsis        HPI:  Patient with a hx of NIDDM2, Afib not on AC, Etoh Abuse presenting with left foot pain. 2 months ago patient was cleaning public pool with a scrubber and scrapped left foot on the pool floor. Blister developed after that. This popped. Wound did not heal and worsened over two months. He has noticed increased swelling, redness, pain in foot over past 2-3 weeks. Endorses some fever and chills. No NVD. Patient states he drinks 2 shots per day and has done this for a long time.       Overview/Hospital Course:  Patient admitted for severe sepsis w/ Afib w/ RVR 2/2 Left foot diabetic foot ulcer. Started on broad spectrum abx. Rates controlled with PO lopressor. Asymptomatic chronic hyponatremia that improved with IVF. Podiatry consulted. Bedside debridement performed. MRI showed no evidence of osteomyelitis so surgical intervention deferred for this current time. Wound cultures with pseudomonas, morganella.       Interval History: no acute events    Review of Systems  Objective:     Vital Signs (Most Recent):  Temp: 97.3 °F (36.3 °C) (07/30/23 1620)  Pulse: 108 (07/30/23 1620)  Resp: 16 (07/30/23 1620)  BP: (!) 142/91 (07/30/23 1620)  SpO2: 96 % (07/30/23 1620) Vital Signs (24h Range):  Temp:  [97.3 °F (36.3 °C)-98.5 °F (36.9 °C)] 97.3 °F (36.3 °C)  Pulse:  [] 108  Resp:  [14-18] 16  SpO2:  [95 %-99 %] 96 %  BP: (117-142)/(67-97) 142/91     Weight: 129 kg (284 lb 6.3 oz)  Body mass index is 38.57 kg/m².    Intake/Output Summary (Last 24 hours) at 7/30/2023 6891  Last data filed at 7/30/2023 0903  Gross per 24 hour   Intake 824.73 ml   Output 2350 ml   Net  -1525.27 ml         Physical Exam  Vitals reviewed.   Constitutional:       General: He is not in acute distress.     Appearance: He is not toxic-appearing or diaphoretic.   Cardiovascular:      Rate and Rhythm: Regular rhythm. Tachycardia present.      Pulses: Normal pulses.      Heart sounds: No murmur heard.     No gallop.   Pulmonary:      Effort: Pulmonary effort is normal.   Musculoskeletal:      Comments: Left foot with improvement in swelling/erythema. Dressing c/d/i   Skin:     General: Skin is warm and dry.   Neurological:      Mental Status: He is alert.             Significant Labs: All pertinent labs within the past 24 hours have been reviewed.  CBC:   Recent Labs   Lab 07/29/23 0452   WBC 8.67   HGB 11.1*   HCT 31.2*        CMP:   Recent Labs   Lab 07/29/23 0452   *   K 3.8      CO2 23   *   BUN 7*   CREATININE 0.8   CALCIUM 8.6*   PROT 6.6   ALBUMIN 2.5*   BILITOT 1.0   ALKPHOS 58   AST 31   ALT 42   ANIONGAP 10       Significant Imaging: I have reviewed all pertinent imaging results/findings within the past 24 hours.  MRI Foot (Forefoot) Left Without Contrast   Final Result      Ulceration and subcutaneous air the plantar surface of the 1st MTP joint without underlying osteomyelitis.      Neuropathic osteo arthropathy of the midfoot.         Electronically signed by: Linda Meehan   Date:    07/28/2023   Time:    17:11      X-Ray Foot Complete Left   Final Result      Extensive abnormalities throughout the midfoot, most likely on the basis of neuropathic joint formation in this diabetic patient.         Electronically signed by: Marion Heredia   Date:    07/26/2023   Time:    17:01      X-Ray Chest AP Portable   Final Result      No acute process.         Electronically signed by: Shai Mendez MD   Date:    07/26/2023   Time:    16:59              Assessment/Plan:      * Severe sepsis  This patient does have evidence of infective focus  My overall impression is  "sepsis.  Source: Skin and Soft Tissue (location diabetic foot wound left foot)  Antibiotics given-   Antibiotics (72h ago, onward)    Start     Stop Route Frequency Ordered    07/29/23 2246  ciprofloxacin (CIPRO)400mg/200ml D5W IVPB 400 mg         -- IV Every 12 hours (non-standard times) 07/29/23 2246    07/28/23 0900  mupirocin 2 % ointment         08/02/23 0859 Nasl 2 times daily 07/28/23 0725    07/27/23 0600  vancomycin (VANCOCIN) 1,500 mg in dextrose 5 % (D5W) 250 mL IVPB  (vancomycin IVPB (PEDS and ADULTS))         -- IV Every 12 hours (non-standard times) 07/26/23 1750        Latest lactate reviewed-  No results for input(s): "LACTATE" in the last 72 hours.  Organ dysfunction indicated by Afib w/ RVR    Fluid challenge Not needed - patient is not hypotensive      Post- resuscitation assessment Yes Perfusion exam was performed within 6 hours of septic shock presentation after bolus shows Adequate tissue perfusion assessed by non-invasive monitoring       Will Not start Pressors- Levophed for MAP of 65  Source control achieved by:   Microbiology Results (last 7 days)     Procedure Component Value Units Date/Time    Blood culture x two cultures. Draw prior to antibiotics. [[id number]] Collected: 07/26/23 1726    Order Status: Completed Specimen: Blood from Peripheral, Lower Arm, Right Updated: 07/30/23 1612     Blood Culture, Routine No Growth to date      No Growth to date      No Growth to date      No Growth to date    Narrative:      Aerobic and anaerobic    Blood culture x two cultures. Draw prior to antibiotics. [[id number]] Collected: 07/26/23 1655    Order Status: Completed Specimen: Blood from Peripheral, Antecubital, Left Updated: 07/30/23 1612     Blood Culture, Routine No Growth to date      No Growth to date      No Growth to date      No Growth to date    Narrative:      Aerobic and anaerobic    Aerobic culture [[id number]]  (Abnormal)  (Susceptibility) Collected: 07/26/23 1816    Order Status: " "Completed Specimen: Wound from Foot, Left Updated: 07/30/23 1301     Aerobic Bacterial Culture MORGANELLA MORGANII  Many        PSEUDOMONAS AERUGINOSA  Rare  Skin mellisa also present      Culture, Anaerobe [[id number]] Collected: 07/26/23 1816    Order Status: Completed Specimen: Wound from Foot, Left Updated: 07/28/23 0749     Anaerobic Culture Culture in progress        Continue IV abx  F/U cultures  Consulted podiatry  Apply skin marker to borders of cellulitis and elevate foot  Pictures in chart  Xray completed and results reviewed  MRI reviewed - no evidence of osteomyelitis  q6h neurovascular checks    Ulcer of left foot with muscle involvement without evidence of necrosis  See above      Abscess of bursa of left foot  See above      Chronic hyponatremia  Moderate hyponatremia -- Improved with IVF          Alcohol use  Patient states he drinks "2 shots" per day  Monitor with PRUDENCIO q4h- call MD if >8  Hold off on ativan for now unless patient starts exhibiting withdrawal symptoms  Thiamine and folic acid  Counseled on cessation      Non-insulin dependent type 2 diabetes mellitus  Patient's FSGs are uncontrolled due to hyperglycemia on current medication regimen.  Last A1c reviewed-   Lab Results   Component Value Date    HGBA1C 9.2 (H) 07/26/2023     Most recent fingerstick glucose reviewed-   Recent Labs   Lab 07/30/23  0221 07/30/23  0719 07/30/23  1120 07/30/23  1626   POCTGLUCOSE 158* 170* 204* 152*     Current correctional scale  Low  Maintain anti-hyperglycemic dose as follows-   Antihyperglycemics (From admission, onward)    Start     Stop Route Frequency Ordered    07/28/23 2100  insulin detemir U-100 (Levemir) pen 15 Units         -- SubQ Nightly 07/28/23 0719    07/26/23 1842  insulin aspart U-100 pen 0-5 Units         -- SubQ Before meals & nightly PRN 07/26/23 1743        Hold Oral hypoglycemics while patient is in the hospital.    Atrial fibrillation with RVR  Persistent Afib w/ RVR on rate control " with metoprolol at home  Continue metoprolol tartrate 25mg BID- Uptitrate as needed.  Continuing therapeutic lovenox for stroke reduction- discussed risks/benefits with patient and they consent to AC          VTE Risk Mitigation (From admission, onward)         Ordered     enoxaparin injection 120 mg  Every 12 hours         07/26/23 1947     Reason for No Pharmacological VTE Prophylaxis  Once        Question:  Reasons:  Answer:  Physician Provided (leave comment)    07/26/23 1743     IP VTE HIGH RISK PATIENT  Once         07/26/23 1743     Place sequential compression device  Until discontinued         07/26/23 1743                Discharge Planning   STEFAN: 7/30/2023     Code Status: Full Code   Is the patient medically ready for discharge?:     Reason for patient still in hospital (select all that apply): Treatment  Discharge Plan A: Home with family   Discharge Delays: None known at this time              Yael Bae MD  Department of Hospital Medicine   [facility] Intensive [facility]

## 2023-07-30 NOTE — ASSESSMENT & PLAN NOTE
"This patient does have evidence of infective focus  My overall impression is sepsis.  Source: Skin and Soft Tissue (location diabetic foot wound left foot)  Antibiotics given-   Antibiotics (72h ago, onward)    Start     Stop Route Frequency Ordered    07/29/23 2246  ciprofloxacin (CIPRO)400mg/200ml D5W IVPB 400 mg         -- IV Every 12 hours (non-standard times) 07/29/23 2246    07/28/23 0900  mupirocin 2 % ointment         08/02/23 0859 Nasl 2 times daily 07/28/23 0725    07/27/23 0600  vancomycin (VANCOCIN) 1,500 mg in dextrose 5 % (D5W) 250 mL IVPB  (vancomycin IVPB (PEDS and ADULTS))         -- IV Every 12 hours (non-standard times) 07/26/23 1750        Latest lactate reviewed-  No results for input(s): "LACTATE" in the last 72 hours.  Organ dysfunction indicated by Afib w/ RVR    Fluid challenge Not needed - patient is not hypotensive      Post- resuscitation assessment Yes Perfusion exam was performed within 6 hours of septic shock presentation after bolus shows Adequate tissue perfusion assessed by non-invasive monitoring       Will Not start Pressors- Levophed for MAP of 65  Source control achieved by:   Microbiology Results (last 7 days)     Procedure Component Value Units Date/Time    Blood culture x two cultures. Draw prior to antibiotics. [[id number]] Collected: 07/26/23 1726    Order Status: Completed Specimen: Blood from Peripheral, Lower Arm, Right Updated: 07/30/23 1612     Blood Culture, Routine No Growth to date      No Growth to date      No Growth to date      No Growth to date    Narrative:      Aerobic and anaerobic    Blood culture x two cultures. Draw prior to antibiotics. [[id number]] Collected: 07/26/23 1655    Order Status: Completed Specimen: Blood from Peripheral, Antecubital, Left Updated: 07/30/23 1612     Blood Culture, Routine No Growth to date      No Growth to date      No Growth to date      No Growth to date    Narrative:      Aerobic and anaerobic    Aerobic culture [[id number]]  " (Abnormal)  (Susceptibility) Collected: 07/26/23 1816    Order Status: Completed Specimen: Wound from Foot, Left Updated: 07/30/23 1301     Aerobic Bacterial Culture MORGANELLA MORGANII  Many        PSEUDOMONAS AERUGINOSA  Rare  Skin mellisa also present      Culture, Anaerobe [[id number]] Collected: 07/26/23 1816    Order Status: Completed Specimen: Wound from Foot, Left Updated: 07/28/23 0749     Anaerobic Culture Culture in progress        Continue IV abx  F/U cultures  Consulted podiatry  Apply skin marker to borders of cellulitis and elevate foot  Pictures in chart  Xray completed and results reviewed  MRI reviewed - no evidence of osteomyelitis  q6h neurovascular checks

## 2023-07-30 NOTE — PLAN OF CARE
Problem: Diabetes Comorbidity  Goal: Blood Glucose Level Within Targeted Range  Outcome: Ongoing, Progressing  Intervention: Monitor and Manage Glycemia  Flowsheets (Taken 7/30/2023 0132)  Glycemic Management: blood glucose monitored     Problem: Adjustment to Illness (Sepsis/Septic Shock)  Goal: Optimal Coping  Outcome: Ongoing, Progressing  Intervention: Optimize Psychosocial Adjustment to Illness  Flowsheets (Taken 7/30/2023 0132)  Supportive Measures: active listening utilized     Problem: Fall Injury Risk  Goal: Absence of Fall and Fall-Related Injury  Outcome: Ongoing, Progressing  Intervention: Promote Injury-Free Environment  Flowsheets (Taken 7/30/2023 0132)  Safety Promotion/Fall Prevention: family to remain at bedside

## 2023-07-30 NOTE — SUBJECTIVE & OBJECTIVE
Interval History: no acute events    Review of Systems  Objective:     Vital Signs (Most Recent):  Temp: 97.3 °F (36.3 °C) (07/30/23 1620)  Pulse: 108 (07/30/23 1620)  Resp: 16 (07/30/23 1620)  BP: (!) 142/91 (07/30/23 1620)  SpO2: 96 % (07/30/23 1620) Vital Signs (24h Range):  Temp:  [97.3 °F (36.3 °C)-98.5 °F (36.9 °C)] 97.3 °F (36.3 °C)  Pulse:  [] 108  Resp:  [14-18] 16  SpO2:  [95 %-99 %] 96 %  BP: (117-142)/(67-97) 142/91     Weight: 129 kg (284 lb 6.3 oz)  Body mass index is 38.57 kg/m².    Intake/Output Summary (Last 24 hours) at 7/30/2023 1725  Last data filed at 7/30/2023 0903  Gross per 24 hour   Intake 824.73 ml   Output 2350 ml   Net -1525.27 ml         Physical Exam  Vitals reviewed.   Constitutional:       General: He is not in acute distress.     Appearance: He is not toxic-appearing or diaphoretic.   Cardiovascular:      Rate and Rhythm: Regular rhythm. Tachycardia present.      Pulses: Normal pulses.      Heart sounds: No murmur heard.     No gallop.   Pulmonary:      Effort: Pulmonary effort is normal.   Musculoskeletal:      Comments: Left foot with improvement in swelling/erythema. Dressing c/d/i   Skin:     General: Skin is warm and dry.   Neurological:      Mental Status: He is alert.             Significant Labs: All pertinent labs within the past 24 hours have been reviewed.  CBC:   Recent Labs   Lab 07/29/23  0452   WBC 8.67   HGB 11.1*   HCT 31.2*        CMP:   Recent Labs   Lab 07/29/23  0452   *   K 3.8      CO2 23   *   BUN 7*   CREATININE 0.8   CALCIUM 8.6*   PROT 6.6   ALBUMIN 2.5*   BILITOT 1.0   ALKPHOS 58   AST 31   ALT 42   ANIONGAP 10       Significant Imaging: I have reviewed all pertinent imaging results/findings within the past 24 hours.  MRI Foot (Forefoot) Left Without Contrast   Final Result      Ulceration and subcutaneous air the plantar surface of the 1st MTP joint without underlying osteomyelitis.      Neuropathic osteo arthropathy of the  midfoot.         Electronically signed by: Linda Meehan   Date:    07/28/2023   Time:    17:11      X-Ray Foot Complete Left   Final Result      Extensive abnormalities throughout the midfoot, most likely on the basis of neuropathic joint formation in this diabetic patient.         Electronically signed by: Marion Heredia   Date:    07/26/2023   Time:    17:01      X-Ray Chest AP Portable   Final Result      No acute process.         Electronically signed by: Shai Mendez MD   Date:    07/26/2023   Time:    16:59

## 2023-07-30 NOTE — ASSESSMENT & PLAN NOTE
Patient's FSGs are uncontrolled due to hyperglycemia on current medication regimen.  Last A1c reviewed-   Lab Results   Component Value Date    HGBA1C 9.2 (H) 07/26/2023     Most recent fingerstick glucose reviewed-   Recent Labs   Lab 07/30/23  0221 07/30/23  0719 07/30/23  1120 07/30/23  1626   POCTGLUCOSE 158* 170* 204* 152*     Current correctional scale  Low  Maintain anti-hyperglycemic dose as follows-   Antihyperglycemics (From admission, onward)    Start     Stop Route Frequency Ordered    07/28/23 2100  insulin detemir U-100 (Levemir) pen 15 Units         -- SubQ Nightly 07/28/23 0719    07/26/23 1842  insulin aspart U-100 pen 0-5 Units         -- SubQ Before meals & nightly PRN 07/26/23 1743        Hold Oral hypoglycemics while patient is in the hospital.

## 2023-07-30 NOTE — ASSESSMENT & PLAN NOTE
Persistent Afib w/ RVR on rate control with metoprolol at home  Continue metoprolol tartrate 25mg BID- Uptitrate as needed.  Continuing therapeutic lovenox for stroke reduction- discussed risks/benefits with patient and they consent to AC       [employer]

## 2023-07-31 ENCOUNTER — TELEPHONE (OUTPATIENT)
Dept: PODIATRY | Facility: CLINIC | Age: 73
End: 2023-07-31
Payer: OTHER GOVERNMENT

## 2023-07-31 VITALS
WEIGHT: 284.38 LBS | HEIGHT: 72 IN | DIASTOLIC BLOOD PRESSURE: 95 MMHG | TEMPERATURE: 98 F | OXYGEN SATURATION: 95 % | HEART RATE: 111 BPM | RESPIRATION RATE: 18 BRPM | BODY MASS INDEX: 38.52 KG/M2 | SYSTOLIC BLOOD PRESSURE: 140 MMHG

## 2023-07-31 LAB
ANION GAP SERPL CALC-SCNC: 12 MMOL/L (ref 8–16)
BACTERIA SPEC AEROBE CULT: ABNORMAL
BACTERIA SPEC AEROBE CULT: ABNORMAL
BASOPHILS NFR BLD: 0 % (ref 0–1.9)
BUN SERPL-MCNC: 11 MG/DL (ref 8–23)
CALCIUM SERPL-MCNC: 10 MG/DL (ref 8.7–10.5)
CHLORIDE SERPL-SCNC: 101 MMOL/L (ref 95–110)
CO2 SERPL-SCNC: 21 MMOL/L (ref 23–29)
CREAT SERPL-MCNC: 1.3 MG/DL (ref 0.5–1.4)
DIFFERENTIAL METHOD: ABNORMAL
EOSINOPHIL NFR BLD: 4 % (ref 0–8)
ERYTHROCYTE [DISTWIDTH] IN BLOOD BY AUTOMATED COUNT: 12.2 % (ref 11.5–14.5)
EST. GFR  (NO RACE VARIABLE): 58 ML/MIN/1.73 M^2
GLUCOSE SERPL-MCNC: 166 MG/DL (ref 70–110)
HCT VFR BLD AUTO: 35.9 % (ref 40–54)
HGB BLD-MCNC: 12.5 G/DL (ref 14–18)
IMM GRANULOCYTES # BLD AUTO: ABNORMAL K/UL (ref 0–0.04)
IMM GRANULOCYTES NFR BLD AUTO: ABNORMAL % (ref 0–0.5)
LYMPHOCYTES NFR BLD: 33 % (ref 18–48)
MCH RBC QN AUTO: 31.3 PG (ref 27–31)
MCHC RBC AUTO-ENTMCNC: 34.8 G/DL (ref 32–36)
MCV RBC AUTO: 90 FL (ref 82–98)
MONOCYTES NFR BLD: 5 % (ref 4–15)
NEUTROPHILS NFR BLD: 52 % (ref 38–73)
NEUTS BAND NFR BLD MANUAL: 6 %
NRBC BLD-RTO: 0 /100 WBC
PLATELET # BLD AUTO: 305 K/UL (ref 150–450)
PMV BLD AUTO: 9.7 FL (ref 9.2–12.9)
POCT GLUCOSE: 154 MG/DL (ref 70–110)
POTASSIUM SERPL-SCNC: 4 MMOL/L (ref 3.5–5.1)
RBC # BLD AUTO: 4 M/UL (ref 4.6–6.2)
SODIUM SERPL-SCNC: 134 MMOL/L (ref 136–145)
WBC # BLD AUTO: 8.42 K/UL (ref 3.9–12.7)

## 2023-07-31 PROCEDURE — 99238 PR HOSPITAL DISCHARGE DAY,<30 MIN: ICD-10-PCS | Mod: ,,, | Performed by: STUDENT IN AN ORGANIZED HEALTH CARE EDUCATION/TRAINING PROGRAM

## 2023-07-31 PROCEDURE — 85027 COMPLETE CBC AUTOMATED: CPT | Performed by: STUDENT IN AN ORGANIZED HEALTH CARE EDUCATION/TRAINING PROGRAM

## 2023-07-31 PROCEDURE — 97162 PT EVAL MOD COMPLEX 30 MIN: CPT

## 2023-07-31 PROCEDURE — 80048 BASIC METABOLIC PNL TOTAL CA: CPT | Performed by: STUDENT IN AN ORGANIZED HEALTH CARE EDUCATION/TRAINING PROGRAM

## 2023-07-31 PROCEDURE — 25000003 PHARM REV CODE 250: Performed by: STUDENT IN AN ORGANIZED HEALTH CARE EDUCATION/TRAINING PROGRAM

## 2023-07-31 PROCEDURE — 85007 BL SMEAR W/DIFF WBC COUNT: CPT | Performed by: STUDENT IN AN ORGANIZED HEALTH CARE EDUCATION/TRAINING PROGRAM

## 2023-07-31 PROCEDURE — 99238 HOSP IP/OBS DSCHRG MGMT 30/<: CPT | Mod: ,,, | Performed by: STUDENT IN AN ORGANIZED HEALTH CARE EDUCATION/TRAINING PROGRAM

## 2023-07-31 PROCEDURE — 99233 PR SUBSEQUENT HOSPITAL CARE,LEVL III: ICD-10-PCS | Mod: ,,, | Performed by: PODIATRIST

## 2023-07-31 PROCEDURE — 99233 SBSQ HOSP IP/OBS HIGH 50: CPT | Mod: ,,, | Performed by: PODIATRIST

## 2023-07-31 PROCEDURE — 36415 COLL VENOUS BLD VENIPUNCTURE: CPT | Performed by: STUDENT IN AN ORGANIZED HEALTH CARE EDUCATION/TRAINING PROGRAM

## 2023-07-31 RX ORDER — CIPROFLOXACIN 500 MG/1
500 TABLET ORAL 2 TIMES DAILY
Qty: 28 TABLET | Refills: 0 | Status: SHIPPED | OUTPATIENT
Start: 2023-07-31 | End: 2023-08-14

## 2023-07-31 RX ORDER — METOPROLOL TARTRATE 25 MG/1
25 TABLET, FILM COATED ORAL ONCE
Qty: 1 TABLET | Refills: 0 | Status: SHIPPED | OUTPATIENT
Start: 2023-07-31 | End: 2023-07-31

## 2023-07-31 RX ORDER — OXYCODONE HYDROCHLORIDE 5 MG/1
5 TABLET ORAL EVERY 6 HOURS PRN
Qty: 20 TABLET | Refills: 0 | Status: SHIPPED | OUTPATIENT
Start: 2023-07-31 | End: 2023-08-05

## 2023-07-31 RX ORDER — METOPROLOL SUCCINATE 25 MG/1
50 TABLET, EXTENDED RELEASE ORAL DAILY
Qty: 60 TABLET | Refills: 2 | Status: SHIPPED | OUTPATIENT
Start: 2023-07-31 | End: 2023-10-29

## 2023-07-31 RX ORDER — CIPROFLOXACIN 500 MG/1
500 TABLET ORAL 2 TIMES DAILY
Qty: 28 TABLET | Refills: 0 | Status: SHIPPED | OUTPATIENT
Start: 2023-07-31 | End: 2023-07-31 | Stop reason: SDUPTHER

## 2023-07-31 RX ORDER — ONDANSETRON 4 MG/1
4 TABLET, FILM COATED ORAL EVERY 8 HOURS PRN
Qty: 15 TABLET | Refills: 0 | Status: SHIPPED | OUTPATIENT
Start: 2023-07-31 | End: 2023-08-05

## 2023-07-31 RX ADMIN — MUPIROCIN: 20 OINTMENT TOPICAL at 09:07

## 2023-07-31 RX ADMIN — APIXABAN 5 MG: 2.5 TABLET, FILM COATED ORAL at 09:07

## 2023-07-31 RX ADMIN — METOPROLOL TARTRATE 25 MG: 25 TABLET, FILM COATED ORAL at 09:07

## 2023-07-31 RX ADMIN — FOLIC ACID 1 MG: 1 TABLET ORAL at 09:07

## 2023-07-31 RX ADMIN — Medication 100 MG: at 09:07

## 2023-07-31 NOTE — PLAN OF CARE
Problem: Adult Inpatient Plan of Care  Goal: Plan of Care Review  Outcome: Met  Goal: Patient-Specific Goal (Individualized)  Outcome: Met  Goal: Absence of Hospital-Acquired Illness or Injury  Outcome: Met  Goal: Optimal Comfort and Wellbeing  Outcome: Met  Goal: Readiness for Transition of Care  Outcome: Met     Problem: Diabetes Comorbidity  Goal: Blood Glucose Level Within Targeted Range  Outcome: Met     Problem: Adjustment to Illness (Sepsis/Septic Shock)  Goal: Optimal Coping  Outcome: Met     Problem: Bleeding (Sepsis/Septic Shock)  Goal: Absence of Bleeding  Outcome: Met     Problem: Glycemic Control Impaired (Sepsis/Septic Shock)  Goal: Blood Glucose Level Within Desired Range  Outcome: Met     Problem: Infection Progression (Sepsis/Septic Shock)  Goal: Absence of Infection Signs and Symptoms  Outcome: Met     Problem: Nutrition Impaired (Sepsis/Septic Shock)  Goal: Optimal Nutrition Intake  Outcome: Met     Problem: Skin Injury Risk Increased  Goal: Skin Health and Integrity  Outcome: Met     Problem: Fall Injury Risk  Goal: Absence of Fall and Fall-Related Injury  Outcome: Met     Problem: Impaired Wound Healing  Goal: Optimal Wound Healing  Outcome: Met

## 2023-07-31 NOTE — TELEPHONE ENCOUNTER
----- Message from Amauri Kramer DPM sent at 7/29/2023  1:28 PM CDT -----  Patient will most likely be discharged from the hospital on Monday please contact him for a follow-up clinic visit next week.  Thank you

## 2023-07-31 NOTE — NURSING
Patient is being discharged home via  with family member. Patient assisted into vehicle via RN. IV site CDI with no redness, swelling, or drainage. Tele removed prior. AVS reviewed. Medication education given. Post op shoe given and reviewed proper placement onto foot. No questions or concerns at this time. All patient needs met and anticipated.

## 2023-07-31 NOTE — PLAN OF CARE
[facility] - Intensive Care  Discharge Final Note    Primary Care Provider: [facility] [place]    Expected Discharge Date: 7/31/2023  Verbal follow up appointments with Dr Kramer & Aysha Lemos provided to patient. Someone from Dr Kramer's office will call with appointment. Demonstrated understanding by verbal feedback. Dr Karmer did not order knee scooter for patient. States he has enough equipment at home that he doesn't need one. No other needs at this time. Spouse at bedside.       Final Discharge Note (most recent)       Final Note - 07/31/23 1120          Final Note    Assessment Type Final Discharge Note     Anticipated Discharge Disposition Home or Self Care     What phone number can be called within the next 1-3 days to see how you are doing after discharge? [phone]     Hospital Resources/Appts/Education Provided Appointments scheduled and added to AVS        Post-Acute Status    Discharge Delays None known at this time                     Important Message from Medicare  Important Message from Medicare regarding Discharge Appeal Rights: Given to patient/caregiver, Explained to patient/caregiver, Signed/date by patient/caregiver     Date IMM was signed: 07/28/23  Time IMM was signed: 1448    Contact Info       Amauri Kramer DPM   Specialty: Podiatry    [address] [facility] [place] [address]   Phone: [phone]       Next Steps: Go on 8/7/2023    Instructions: appointment Monday Aug 7th at 10:00am for hospital follow up    Dax Kramer MD   Specialty: Cardiology, General Surgery    [address] [facility] [place] [address]   Phone: [phone]       Next Steps: Follow up    Instructions: someone will call with appointment    Aysha Lemos NP   Specialty: Family Medicine    [address] [facility] [place] [address]   Phone: [phone]       Next Steps: Go on 8/29/2023    Instructions: appointment Tuesday August 29th at 1:00pm for  hospital follow up; someone may call with an earlier appointment

## 2023-07-31 NOTE — PT/OT/SLP EVAL
Physical Therapy Evaluation and Discharge Note    Patient Name:  Bubba Gtz   MRN:  [id number]    Recommendations:     Discharge Recommendations:  (home with outpatient followup)  Discharge Equipment Recommendations:  (none, he owns a RW)   Barriers to discharge: None    Assessment:   HPI:  Patient with a hx of NIDDM2, Afib not on AC, Etoh Abuse presenting with left foot pain. 2 months ago patient was cleaning public pool with a scrubber and scrapped left foot on the pool floor. Blister developed after that. This popped. Wound did not heal and worsened over two months. He has noticed increased swelling, redness, pain in foot over past 2-3 weeks. Endorses some fever and chills. No NVD. Patient states he drinks 2 shots per day and has done this for a long time.     Bubba Gtz is a 73 y.o. male admitted with a medical diagnosis of Severe sepsis. .  At this time, patient is functioning at their prior level of function and does not require further acute PT services.     Recent Surgery: * No surgery found *      Plan:     During this hospitalization, patient does not require further acute PT services.  Please re-consult if situation changes.      Subjective     Chief Complaint: severe sepsis  Patient/Family Comments/goals: discharge home today  Pain/Comfort:  Pain Rating 1: 0/10    Patients cultural, spiritual, Jain conflicts given the current situation: no    Living Environment:  Patient lives with his wife in a raised home with 17-19 steps with handrails present to enter.  Prior to admission, patients level of function was independent.  Equipment used at home: none.  DME owned (not currently used): rolling walker and single point cane.  Upon discharge, patient will have assistance from his wife.    Objective:     Communicated with RN prior to session.  Patient found HOB elevated with peripheral IV upon PT entry to room.    General Precautions: Standard,      Orthopedic Precautions: (NWB LLE per  podiatry)   Braces:  (cast shoe on left)  Respiratory Status: Room air    Exams:  Cognitive Exam:  Patient is oriented to Person, Place, Time, and Situation  RLE ROM: WFL  RLE Strength: WFL  LLE ROM: WFL  LLE Strength: WFL    Functional Mobility:  Bed Mobility:  Supine to Sit: independence  Sit to Supine: independence  Transfers:  Sit to Stand:  independence with rolling walker  Gait: patient ambulated 50' with RW and SBA, verbal cueing from PT to maintain NWB LLE    AM-PAC 6 CLICK MOBILITY  Total Score:        Treatment and Education:  Patient instructed in need for AD and NWB to LLE to facilitate healing and for decrease fall risk, as well as to use cast shoe to protect and keep dressing c/d/I. Patient and spouse verbalized understanding of instructions and education provided to him today.    AM-PAC 6 CLICK MOBILITY  Total Score:      Patient left sitting edge of bed with all lines intact, RN notified, and spouse present.    GOALS:   N/A, patient is discharging home today and will no longer require acute PT services.      History:     Past Medical History:   Diagnosis Date    A-fib     Diabetes mellitus, type 2     Hypertension     Primary osteoarthritis of left knee 9/19/2022       Past Surgical History:   Procedure Laterality Date    JOINT REPLACEMENT  2013    Knee    KNEE ARTHROSCOPY      TOTAL KNEE ARTHROPLASTY Left 9/19/2022    Procedure: LEFT ARTHROPLASTY, KNEE, TOTAL;  Surgeon: Ludwin Palacio DO;  Location: [facility];  Service: Orthopedics;  Laterality: Left;       Time Tracking:     PT Received On: 07/31/23  PT Start Time: 1010     PT Stop Time: 1030  PT Total Time (min): 20 min     Billable Minutes: Evaluation 20 min      07/31/2023

## 2023-07-31 NOTE — PROGRESS NOTES
[facility] - Intensive Care  Podiatry  Progress Note    Patient Name: Bubba Gtz  MRN: [id number]  Admission Date: 7/26/2023  Hospital Length of Stay: 5 days  Attending Physician: Gómez Arreguin MD  Primary Care Provider: [facility] [place]     Subjective:     Interval History: Patient presents follow-up inpatient consultation due to severe infection involving the patient's left foot.  Patient states he is having less pain and discomfort although the patient does have diabetic related neuropathy.        Scheduled Meds:   apixaban  5 mg Oral BID    ciprofloxacin  400 mg Intravenous Q12H    folic acid  1 mg Oral Daily    insulin detemir U-100  15 Units Subcutaneous QHS    metoprolol tartrate  25 mg Oral BID    mupirocin   Nasal BID    thiamine  100 mg Oral Daily     Continuous Infusions:  PRN Meds:acetaminophen, glucagon (human recombinant), glucose, glucose, insulin aspart U-100, morphine, naloxone, ondansetron, oxyCODONE, prochlorperazine, sodium chloride 0.9%, trazodone    Review of Systems  Objective:     Vital Signs (Most Recent):  Temp: 97.7 °F (36.5 °C) (07/31/23 0719)  Pulse: (!) 111 (07/31/23 0719)  Resp: 18 (07/31/23 0719)  BP: (!) 140/95 (07/31/23 0719)  SpO2: 95 % (07/31/23 0719) Vital Signs (24h Range):  Temp:  [97.3 °F (36.3 °C)-98.5 °F (36.9 °C)] 97.7 °F (36.5 °C)  Pulse:  [] 111  Resp:  [16-18] 18  SpO2:  [95 %-99 %] 95 %  BP: (133-142)/(77-95) 140/95     Weight: 129 kg (284 lb 6.3 oz)  Body mass index is 38.57 kg/m².    Foot Exam  Physical Exam  Vitals and nursing note reviewed.   Constitutional:       Appearance: Normal appearance.   Cardiovascular:      Pulses:           Dorsalis pedis pulses are 1+ on the right side and 1+ on the left side.        Posterior tibial pulses are 0 on the right side and 0 on the left side.   Pulmonary:      Effort: Pulmonary effort is normal.   Musculoskeletal:         General: Swelling, tenderness and deformity present.      Left lower  leg: Edema present.      Left foot: Decreased range of motion. Deformity present.        Feet:    Feet:      Right foot:      Protective Sensation: 4 sites tested.   1 site sensed.     Left foot:      Protective Sensation: 4 sites tested.   1 site sensed.     Skin integrity: Ulcer, blister, skin breakdown, erythema and warmth present.   Skin:     Capillary Refill: Capillary refill takes more than 3 seconds.      Findings: Erythema present.   Neurological:      Mental Status: He is alert.      Sensory: Sensory deficit present.   Psychiatric:         Mood and Affect: Mood normal.         Behavior: Behavior normal.   Laboratory:  All pertinent labs reviewed within the last 24 hours.    Diagnostic Results:  None    Clinical Findings:              Assessment/Plan:     Active Diagnoses:    Diagnosis Date Noted POA    PRINCIPAL PROBLEM:  Severe sepsis [A41.9, R65.20] 07/26/2023 Yes    Abscess of bursa of left foot [M71.072] 07/27/2023 Yes    Ulcer of left foot with muscle involvement without evidence of necrosis [L97.525] 07/27/2023 Yes    Alcohol use [Z78.9] 07/26/2023 Yes    Chronic hyponatremia [E87.1] 07/26/2023 Yes    Atrial fibrillation with RVR [I48.91] 09/19/2022 Yes    Non-insulin dependent type 2 diabetes mellitus [E11.9] 09/19/2022 Yes      Problems Resolved During this Admission:     Patient presents follow-up inpatient consultation due to severe infection involving the patient's left foot.  Patient states he is having less pain and discomfort although the patient does have diabetic related neuropathy.  Patient is currently on Cipro his preliminary culture and sensitivity is positive for both so done Pseudomonas and Morganella antibiotics will be adjusted accordingly once these have finalized but I am okay with the patient being discharged on oral Cipro and I have sent a prescription to the patient's pharmacy.  I did advise hospitalist medicine I am okay with the patient being discharged and will plan to follow  up with him in clinic.  Patient's left foot is stable however I explained to the patient and the patient's wife who was present today the patient must stay off of the foot keep the dressing dry and clean he is does still have some cellulitis on the plantar arch area of the left foot most of the cellulitis and edema on the dorsal aspect of the foot has resolved he still has a large necrotic area on the medial eminence and an ulceration sub 1st MPJ left however it is not draining purulent drainage it as it had previously.  Wound care was provided today all areas were flushed irrigated with Dakin solution Dakin solution wet-to-dry dressing applied a well-padded thick protective dressing was applied nursing will apply an Ace bandage and dispensed the patient a postoperative shoe on discharge.  I did send a prescription for the patient's Cipro he has been advised to take this as directed he must stay off of the foot I did stress to the patient multiple times the need for compliance otherwise he could end up needing surgical intervention and or an amputation if the area does not heal appropriately.  Patient again reminded he must not only stay off the area but keep it clean and dry he can not get it wet showering which he did ask me about earlier today.  Patient is going to leave the dressing intact and I will change the dressing when I follow up with him in clinic as in discharge is anticipated today.  Total face-to-face time including discussion evaluation treatment chart review documentation of today's visit and wound care as well as discussion of treatment plan with the patient the patient's wife equaled 40 minutes.This note was created using iFit voice recognition software that occasionally misinterpreted phrases or words.       Amauri Kramer DPM  Podiatry  [facility] - Intensive Care

## 2023-08-01 ENCOUNTER — TELEPHONE (OUTPATIENT)
Dept: CARDIOLOGY | Facility: CLINIC | Age: 73
End: 2023-08-01
Payer: OTHER GOVERNMENT

## 2023-08-01 ENCOUNTER — PATIENT OUTREACH (OUTPATIENT)
Dept: ADMINISTRATIVE | Facility: CLINIC | Age: 73
End: 2023-08-01
Payer: OTHER GOVERNMENT

## 2023-08-01 LAB
BACTERIA BLD CULT: NORMAL
BACTERIA BLD CULT: NORMAL

## 2023-08-01 NOTE — TELEPHONE ENCOUNTER
LVM letting him know he is set up with a HFU.   ----- Message from Connie Zuniga sent at 7/31/2023 10:40 AM CDT -----  Regarding: follow up  Contact: arturo  Type:  Sooner Apoointment Request    Caller is requesting a sooner appointment.  Caller declined first available appointment listed below.  Caller will not accept being placed on the waitlist and is requesting a message be sent to doctor.  Name of Caller:Arturo with [facility]  When is the first available appointment?9/13    Would the patient rather a call back or a response via [facility]? Call back  Best Call Back Number:[phone]    Additional Information: sts he is being discharged to day and needs a 2 week follow up

## 2023-08-01 NOTE — PROGRESS NOTES
C3 nurse attempted to contact Bubba Gtz for a TCC post hospital discharge follow up call. No answer. Left voicemail with callback information. The patient has a scheduled HOSFU appointment with Aysha Lemos NP on 08/29/23 @ 1300.         Non-och pcp, unable to route to pcp/staff.

## 2023-08-02 LAB — BACTERIA SPEC ANAEROBE CULT: ABNORMAL

## 2023-08-02 NOTE — PROGRESS NOTES
C3 nurse spoke with Bubba Gtz for a TCC post hospital discharge follow up call. The patient has a scheduled HOSFU appointment with Aysha Lemos NP on 08/29/23 @ 1300.        Non-och pcp, unable to route to pcp/staff.   Detail Level: Detailed Quality 265: Biopsy Follow-Up: Biopsy results reviewed, communicated, tracked, and documented Quality 431: Preventive Care And Screening: Unhealthy Alcohol Use - Screening: Patient screened for unhealthy alcohol use using a single question and scores less than 2 times per year Quality 130: Documentation Of Current Medications In The Medical Record: Current Medications Documented Quality 226: Preventive Care And Screening: Tobacco Use: Screening And Cessation Intervention: Patient screened for tobacco and is a smoker AND received Cessation Counseling Quality 143: Oncology: Medical And Radiation- Pain Intensity Quantified: Pain severity quantified, no pain present Quality 110: Preventive Care And Screening: Influenza Immunization: Influenza Immunization previously received during influenza season

## 2023-08-02 NOTE — DISCHARGE SUMMARY
Formerly [facility] Medicine  Discharge Summary      Patient Name: Bubba Gtz  MRN: [id number]  LEANNA: [id number]  Patient Class: IP- Inpatient  Admission Date: 7/26/2023  Hospital Length of Stay: 5 days  Discharge Date and Time: 7/31/2023 12:07 PM  Attending Physician: No att. providers found   Discharging Provider: Gómez Arreguin MD  Primary Care Provider: [facility] [place]    Primary Care Team: Networked reference to record PCT     HPI:   Patient with a hx of NIDDM2, Afib not on AC, Etoh Abuse presenting with left foot pain. 2 months ago patient was cleaning public pool with a scrubber and scrapped left foot on the pool floor. Blister developed after that. This popped. Wound did not heal and worsened over two months. He has noticed increased swelling, redness, pain in foot over past 2-3 weeks. Endorses some fever and chills. No NVD. Patient states he drinks 2 shots per day and has done this for a long time.       * No surgery found *      Hospital Course:   Patient admitted for severe sepsis w/ Afib w/ RVR 2/2 Left foot diabetic foot ulcer. Started on broad spectrum abx. Rates controlled with PO lopressor. Asymptomatic chronic hyponatremia that improved with IVF. Podiatry consulted. Bedside debridement performed. MRI showed no evidence of osteomyelitis so surgical intervention deferred for this current time. Wound cultures with pseudomonas, morganella. Podiatry cleared patient for dsicharge. He was placed on PO abx. Discharge instructions and return precautions provided.       Goals of Care Treatment Preferences:  Code Status: Full Code      Consults:     No new Assessment & Plan notes have been filed under this hospital service since the last note was generated.  Service: Hospital Medicine    Final Active Diagnoses:    Diagnosis Date Noted POA    PRINCIPAL PROBLEM:  Severe sepsis [A41.9, R65.20] 07/26/2023 Yes    Abscess of bursa of left foot [M71.072] 07/27/2023  Yes    Ulcer of left foot with muscle involvement without evidence of necrosis [L97.525] 07/27/2023 Yes    Alcohol use [Z78.9] 07/26/2023 Yes    Chronic hyponatremia [E87.1] 07/26/2023 Yes    Atrial fibrillation with RVR [I48.91] 09/19/2022 Yes    Non-insulin dependent type 2 diabetes mellitus [E11.9] 09/19/2022 Yes      Problems Resolved During this Admission:       Discharged Condition: good    Disposition: Home or Self Care    Follow Up:   Follow-up Information     Amauri Kramer DPM. Go on 8/7/2023.    Specialty: Podiatry  Why: appointment Monday Aug 7th at 10:00am for hospital follow up  Contact information:  [address] [facility] [place] [address] [phone]             Dax Kramer MD Follow up.    Specialties: Cardiology, General Surgery  Why: someone will call with appointment  Contact information:  [address] [facility] [address] [phone]             Aysha E Canelo, NP. Go on 8/29/2023.    Specialty: Family Medicine  Why: appointment Tuesday August 29th at 1:00pm for hospital follow up; someone may call with an earlier appointment  Contact information:  [address]  [facility] [address] [phone]                       Patient Instructions:      Diet diabetic     Diet Cardiac     Notify your health care provider if you experience any of the following:  temperature >100.4     Notify your health care provider if you experience any of the following:  severe uncontrolled pain     Notify your health care provider if you experience any of the following:  persistent dizziness, light-headedness, or visual disturbances     Activity as tolerated       Significant Diagnostic Studies:   Recent Results (from the past 168 hour(s))   POCT glucose    Collection Time: 07/26/23  4:12 PM   Result Value Ref Range    POCT Glucose 266 (H) 70 - 110 mg/dL   HIV 1/2 Ag/Ab (4th Gen)    Collection Time: 07/26/23  4:55 PM   Result Value Ref Range    HIV  1/2 Ag/Ab Non-reactive Non-reactive   Hepatitis C Antibody    Collection Time: 07/26/23  4:55 PM   Result Value Ref Range    Hepatitis C Ab Non-reactive Non-reactive   Blood culture x two cultures. Draw prior to antibiotics.    Collection Time: 07/26/23  4:55 PM    Specimen: Peripheral, Antecubital, Left; Blood   Result Value Ref Range    Blood Culture, Routine No growth after 5 days.    CBC auto differential    Collection Time: 07/26/23  4:55 PM   Result Value Ref Range    WBC 19.53 (H) 3.90 - 12.70 K/uL    RBC 4.37 (L) 4.60 - 6.20 M/uL    Hemoglobin 13.7 (L) 14.0 - 18.0 g/dL    Hematocrit 39.7 (L) 40.0 - 54.0 %    MCV 91 82 - 98 fL    MCH 31.4 (H) 27.0 - 31.0 pg    MCHC 34.5 32.0 - 36.0 g/dL    RDW 12.2 11.5 - 14.5 %    Platelets 263 150 - 450 K/uL    MPV 9.9 9.2 - 12.9 fL    Immature Granulocytes 0.4 0.0 - 0.5 %    Gran # (ANC) 16.2 (H) 1.8 - 7.7 K/uL    Immature Grans (Abs) 0.08 (H) 0.00 - 0.04 K/uL    Lymph # 1.8 1.0 - 4.8 K/uL    Mono # 1.4 (H) 0.3 - 1.0 K/uL    Eos # 0.1 0.0 - 0.5 K/uL    Baso # 0.04 0.00 - 0.20 K/uL    nRBC 0 0 /100 WBC    Gran % 82.9 (H) 38.0 - 73.0 %    Lymph % 9.1 (L) 18.0 - 48.0 %    Mono % 7.1 4.0 - 15.0 %    Eosinophil % 0.3 0.0 - 8.0 %    Basophil % 0.2 0.0 - 1.9 %    Differential Method Automated    Comprehensive metabolic panel    Collection Time: 07/26/23  4:55 PM   Result Value Ref Range    Sodium 125 (L) 136 - 145 mmol/L    Potassium 4.2 3.5 - 5.1 mmol/L    Chloride 91 (L) 95 - 110 mmol/L    CO2 22 (L) 23 - 29 mmol/L    Glucose 258 (H) 70 - 110 mg/dL    BUN 13 8 - 23 mg/dL    Creatinine 1.2 0.5 - 1.4 mg/dL    Calcium 9.9 8.7 - 10.5 mg/dL    Total Protein 9.1 (H) 6.0 - 8.4 g/dL    Albumin 3.8 3.5 - 5.2 g/dL    Total Bilirubin 1.7 (H) 0.1 - 1.0 mg/dL    Alkaline Phosphatase 74 55 - 135 U/L    AST 24 10 - 40 U/L    ALT 19 10 - 44 U/L    eGFR >60.0 >60 mL/min/1.73 m^2    Anion Gap 12 8 - 16 mmol/L   Lactic acid, plasma #1    Collection Time: 07/26/23  4:55 PM   Result Value Ref Range     Lactate (Lactic Acid) 1.5 0.5 - 2.2 mmol/L   Magnesium    Collection Time: 07/26/23  4:55 PM   Result Value Ref Range    Magnesium 2.0 1.6 - 2.6 mg/dL   Troponin I    Collection Time: 07/26/23  4:55 PM   Result Value Ref Range    Troponin I <0.006 0.000 - 0.026 ng/mL   Hemoglobin A1C    Collection Time: 07/26/23  4:55 PM   Result Value Ref Range    Hemoglobin A1C 9.2 (H) 4.0 - 5.6 %    Estimated Avg Glucose 217 (H) 68 - 131 mg/dL   Lipid Panel    Collection Time: 07/26/23  4:55 PM   Result Value Ref Range    Cholesterol 144 120 - 199 mg/dL    Triglycerides 61 30 - 150 mg/dL    HDL 51 40 - 75 mg/dL    LDL Cholesterol 80.8 63.0 - 159.0 mg/dL    HDL/Cholesterol Ratio 35.4 20.0 - 50.0 %    Total Cholesterol/HDL Ratio 2.8 2.0 - 5.0    Non-HDL Cholesterol 93 mg/dL   TSH    Collection Time: 07/26/23  4:55 PM   Result Value Ref Range    TSH 3.556 0.400 - 4.000 uIU/mL   Blood culture x two cultures. Draw prior to antibiotics.    Collection Time: 07/26/23  5:26 PM    Specimen: Peripheral, Lower Arm, Right; Blood   Result Value Ref Range    Blood Culture, Routine No growth after 5 days.    Culture, Anaerobe    Collection Time: 07/26/23  6:16 PM    Specimen: Foot, Left; Wound   Result Value Ref Range    Anaerobic Culture FUSOBACTERIUM SPECIES  Moderate   (A)    Aerobic culture    Collection Time: 07/26/23  6:16 PM    Specimen: Foot, Left; Wound   Result Value Ref Range    Aerobic Bacterial Culture MORGANELLA MORGANII  Many   (A)     Aerobic Bacterial Culture (A)      PSEUDOMONAS AERUGINOSA  Rare  Skin mellisa also present         Susceptibility    Morganella morganii - CULTURE, AEROBIC  (SPECIFY SOURCE)     Amp/Sulbactam >16/8 Resistant mcg/mL     Ceftriaxone <=1 Sensitive mcg/mL     Ciprofloxacin <=1 Sensitive mcg/mL     Cefepime <=2 Sensitive mcg/mL     Ertapenem <=0.5 Sensitive mcg/mL     Gentamicin <=4 Sensitive mcg/mL     Levofloxacin <=2 Sensitive mcg/mL     Meropenem <=1 Sensitive mcg/mL     Piperacillin/Tazo <=16  Sensitive mcg/mL     Trimeth/Sulfa <=2/38 Sensitive mcg/mL     Tobramycin <=4 Sensitive mcg/mL    Pseudomonas aeruginosa - CULTURE, AEROBIC  (SPECIFY SOURCE)     Amikacin <=16 Sensitive mcg/mL     Ciprofloxacin <=1 Sensitive mcg/mL     Cefepime <=2 Sensitive mcg/mL     Gentamicin <=4 Sensitive mcg/mL     Levofloxacin <=2 Sensitive mcg/mL     Meropenem <=1 Sensitive mcg/mL     Piperacillin/Tazo <=16 Sensitive mcg/mL     Tobramycin <=4 Sensitive mcg/mL   Urinalysis, Reflex to Urine Culture Urine, Clean Catch    Collection Time: 07/26/23  7:15 PM    Specimen: Urine   Result Value Ref Range    Specimen UA Urine, Clean Catch     Color, UA Not Entered Yellow, Straw, Pau    Appearance, UA Not Entered Clear    pH, UA 6.0 5.0 - 8.0    Specific Gravity, UA 1.015 1.005 - 1.030    Protein, UA 1+ (A) Negative    Glucose, UA 2+ (A) Negative    Ketones, UA 3+ (A) Negative    Bilirubin (UA) Negative Negative    Occult Blood UA 1+ (A) Negative    Nitrite, UA Negative Negative    Urobilinogen, UA 2.0-3.0 (A) Negative EU/dL    Leukocytes, UA Negative Negative   Urinalysis Microscopic    Collection Time: 07/26/23  7:15 PM   Result Value Ref Range    RBC, UA 10 (H) 0 - 4 /hpf    WBC, UA 0 0 - 5 /hpf    Bacteria Rare None-Occ /hpf    Hyaline Casts, UA 0 0-1/lpf /lpf    Microscopic Comment SEE COMMENT    POCT glucose    Collection Time: 07/26/23  8:37 PM   Result Value Ref Range    POCT Glucose 248 (H) 70 - 110 mg/dL   Lactic acid, plasma #2    Collection Time: 07/26/23  8:45 PM   Result Value Ref Range    Lactate (Lactic Acid) 1.9 0.5 - 2.2 mmol/L   Basic metabolic panel    Collection Time: 07/26/23  8:45 PM   Result Value Ref Range    Sodium 127 (L) 136 - 145 mmol/L    Potassium 4.2 3.5 - 5.1 mmol/L    Chloride 96 95 - 110 mmol/L    CO2 20 (L) 23 - 29 mmol/L    Glucose 242 (H) 70 - 110 mg/dL    BUN 11 8 - 23 mg/dL    Creatinine 0.9 0.5 - 1.4 mg/dL    Calcium 8.7 8.7 - 10.5 mg/dL    Anion Gap 11 8 - 16 mmol/L    eGFR >60.0 >60  mL/min/1.73 m^2   Osmolality, Serum    Collection Time: 07/26/23  8:45 PM   Result Value Ref Range    Osmolality 285 280 - 300 mOsm/kg   Osmolality, urine    Collection Time: 07/26/23  9:40 PM   Result Value Ref Range    Osmolality, Urine 445 50 - 1200 mOsm/kg   Sodium, urine, random    Collection Time: 07/26/23  9:40 PM   Result Value Ref Range    Sodium, Urine 30 20 - 250 mmol/L   POCT glucose    Collection Time: 07/27/23  2:38 AM   Result Value Ref Range    POCT Glucose 216 (H) 70 - 110 mg/dL   CBC Auto Differential    Collection Time: 07/27/23  5:16 AM   Result Value Ref Range    WBC 13.36 (H) 3.90 - 12.70 K/uL    RBC 3.69 (L) 4.60 - 6.20 M/uL    Hemoglobin 11.6 (L) 14.0 - 18.0 g/dL    Hematocrit 33.5 (L) 40.0 - 54.0 %    MCV 91 82 - 98 fL    MCH 31.4 (H) 27.0 - 31.0 pg    MCHC 34.6 32.0 - 36.0 g/dL    RDW 12.4 11.5 - 14.5 %    Platelets 198 150 - 450 K/uL    MPV 10.0 9.2 - 12.9 fL    Immature Granulocytes 0.4 0.0 - 0.5 %    Gran # (ANC) 9.6 (H) 1.8 - 7.7 K/uL    Immature Grans (Abs) 0.06 (H) 0.00 - 0.04 K/uL    Lymph # 2.3 1.0 - 4.8 K/uL    Mono # 1.1 (H) 0.3 - 1.0 K/uL    Eos # 0.3 0.0 - 0.5 K/uL    Baso # 0.06 0.00 - 0.20 K/uL    nRBC 0 0 /100 WBC    Gran % 71.8 38.0 - 73.0 %    Lymph % 17.1 (L) 18.0 - 48.0 %    Mono % 8.4 4.0 - 15.0 %    Eosinophil % 1.9 0.0 - 8.0 %    Basophil % 0.4 0.0 - 1.9 %    Differential Method Automated    Comprehensive Metabolic Panel    Collection Time: 07/27/23  5:16 AM   Result Value Ref Range    Sodium 130 (L) 136 - 145 mmol/L    Potassium 4.3 3.5 - 5.1 mmol/L    Chloride 103 95 - 110 mmol/L    CO2 18 (L) 23 - 29 mmol/L    Glucose 205 (H) 70 - 110 mg/dL    BUN 10 8 - 23 mg/dL    Creatinine 0.9 0.5 - 1.4 mg/dL    Calcium 8.6 (L) 8.7 - 10.5 mg/dL    Total Protein 6.6 6.0 - 8.4 g/dL    Albumin 2.7 (L) 3.5 - 5.2 g/dL    Total Bilirubin 0.9 0.1 - 1.0 mg/dL    Alkaline Phosphatase 56 55 - 135 U/L    AST 33 10 - 40 U/L    ALT 31 10 - 44 U/L    eGFR >60.0 >60 mL/min/1.73 m^2    Anion  Gap 9 8 - 16 mmol/L   Magnesium    Collection Time: 07/27/23  5:16 AM   Result Value Ref Range    Magnesium 1.9 1.6 - 2.6 mg/dL   Phosphorus    Collection Time: 07/27/23  5:16 AM   Result Value Ref Range    Phosphorus 2.8 2.7 - 4.5 mg/dL   POCT glucose    Collection Time: 07/27/23  7:16 AM   Result Value Ref Range    POCT Glucose 209 (H) 70 - 110 mg/dL   POCT glucose    Collection Time: 07/27/23 11:11 AM   Result Value Ref Range    POCT Glucose 256 (H) 70 - 110 mg/dL   POCT glucose    Collection Time: 07/27/23  4:11 PM   Result Value Ref Range    POCT Glucose 225 (H) 70 - 110 mg/dL   POCT glucose    Collection Time: 07/27/23  8:42 PM   Result Value Ref Range    POCT Glucose 280 (H) 70 - 110 mg/dL   POCT glucose    Collection Time: 07/28/23  2:08 AM   Result Value Ref Range    POCT Glucose 183 (H) 70 - 110 mg/dL   CBC Auto Differential    Collection Time: 07/28/23  4:23 AM   Result Value Ref Range    WBC 9.85 3.90 - 12.70 K/uL    RBC 3.48 (L) 4.60 - 6.20 M/uL    Hemoglobin 11.0 (L) 14.0 - 18.0 g/dL    Hematocrit 31.8 (L) 40.0 - 54.0 %    MCV 91 82 - 98 fL    MCH 31.6 (H) 27.0 - 31.0 pg    MCHC 34.6 32.0 - 36.0 g/dL    RDW 12.3 11.5 - 14.5 %    Platelets 223 150 - 450 K/uL    MPV 9.8 9.2 - 12.9 fL    Immature Granulocytes 0.5 0.0 - 0.5 %    Gran # (ANC) 7.1 1.8 - 7.7 K/uL    Immature Grans (Abs) 0.05 (H) 0.00 - 0.04 K/uL    Lymph # 1.6 1.0 - 4.8 K/uL    Mono # 0.8 0.3 - 1.0 K/uL    Eos # 0.3 0.0 - 0.5 K/uL    Baso # 0.06 0.00 - 0.20 K/uL    nRBC 0 0 /100 WBC    Gran % 72.0 38.0 - 73.0 %    Lymph % 16.2 (L) 18.0 - 48.0 %    Mono % 7.7 4.0 - 15.0 %    Eosinophil % 3.0 0.0 - 8.0 %    Basophil % 0.6 0.0 - 1.9 %    Differential Method Automated    Comprehensive Metabolic Panel    Collection Time: 07/28/23  4:23 AM   Result Value Ref Range    Sodium 131 (L) 136 - 145 mmol/L    Potassium 3.9 3.5 - 5.1 mmol/L    Chloride 95 95 - 110 mmol/L    CO2 23 23 - 29 mmol/L    Glucose 345 (H) 70 - 110 mg/dL    BUN 9 8 - 23 mg/dL     Creatinine 1.0 0.5 - 1.4 mg/dL    Calcium 8.2 (L) 8.7 - 10.5 mg/dL    Total Protein 6.8 6.0 - 8.4 g/dL    Albumin 2.6 (L) 3.5 - 5.2 g/dL    Total Bilirubin 0.9 0.1 - 1.0 mg/dL    Alkaline Phosphatase 58 55 - 135 U/L    AST 32 10 - 40 U/L    ALT 35 10 - 44 U/L    eGFR >60.0 >60 mL/min/1.73 m^2    Anion Gap 13 8 - 16 mmol/L   Magnesium    Collection Time: 07/28/23  4:23 AM   Result Value Ref Range    Magnesium 1.9 1.6 - 2.6 mg/dL   Phosphorus    Collection Time: 07/28/23  4:23 AM   Result Value Ref Range    Phosphorus 2.8 2.7 - 4.5 mg/dL   VANCOMYCIN, TROUGH    Collection Time: 07/28/23  4:23 AM   Result Value Ref Range    Vancomycin-Trough 15.0 10.0 - 22.0 ug/mL   POCT glucose    Collection Time: 07/28/23  7:09 AM   Result Value Ref Range    POCT Glucose 221 (H) 70 - 110 mg/dL   POCT glucose    Collection Time: 07/28/23 11:15 AM   Result Value Ref Range    POCT Glucose 278 (H) 70 - 110 mg/dL   POCT glucose    Collection Time: 07/28/23  5:08 PM   Result Value Ref Range    POCT Glucose 174 (H) 70 - 110 mg/dL   POCT glucose    Collection Time: 07/28/23  8:47 PM   Result Value Ref Range    POCT Glucose 231 (H) 70 - 110 mg/dL   POCT glucose    Collection Time: 07/29/23  2:04 AM   Result Value Ref Range    POCT Glucose 174 (H) 70 - 110 mg/dL   CBC Auto Differential    Collection Time: 07/29/23  4:52 AM   Result Value Ref Range    WBC 8.67 3.90 - 12.70 K/uL    RBC 3.50 (L) 4.60 - 6.20 M/uL    Hemoglobin 11.1 (L) 14.0 - 18.0 g/dL    Hematocrit 31.2 (L) 40.0 - 54.0 %    MCV 89 82 - 98 fL    MCH 31.7 (H) 27.0 - 31.0 pg    MCHC 35.6 32.0 - 36.0 g/dL    RDW 12.1 11.5 - 14.5 %    Platelets 248 150 - 450 K/uL    MPV 9.9 9.2 - 12.9 fL    Immature Granulocytes 0.5 0.0 - 0.5 %    Gran # (ANC) 5.4 1.8 - 7.7 K/uL    Immature Grans (Abs) 0.04 0.00 - 0.04 K/uL    Lymph # 2.0 1.0 - 4.8 K/uL    Mono # 0.9 0.3 - 1.0 K/uL    Eos # 0.3 0.0 - 0.5 K/uL    Baso # 0.06 0.00 - 0.20 K/uL    nRBC 0 0 /100 WBC    Gran % 61.9 38.0 - 73.0 %    Lymph %  23.3 18.0 - 48.0 %    Mono % 9.8 4.0 - 15.0 %    Eosinophil % 3.8 0.0 - 8.0 %    Basophil % 0.7 0.0 - 1.9 %    Differential Method Automated    Comprehensive Metabolic Panel    Collection Time: 07/29/23  4:52 AM   Result Value Ref Range    Sodium 133 (L) 136 - 145 mmol/L    Potassium 3.8 3.5 - 5.1 mmol/L    Chloride 100 95 - 110 mmol/L    CO2 23 23 - 29 mmol/L    Glucose 168 (H) 70 - 110 mg/dL    BUN 7 (L) 8 - 23 mg/dL    Creatinine 0.8 0.5 - 1.4 mg/dL    Calcium 8.6 (L) 8.7 - 10.5 mg/dL    Total Protein 6.6 6.0 - 8.4 g/dL    Albumin 2.5 (L) 3.5 - 5.2 g/dL    Total Bilirubin 1.0 0.1 - 1.0 mg/dL    Alkaline Phosphatase 58 55 - 135 U/L    AST 31 10 - 40 U/L    ALT 42 10 - 44 U/L    eGFR >60.0 >60 mL/min/1.73 m^2    Anion Gap 10 8 - 16 mmol/L   Magnesium    Collection Time: 07/29/23  4:52 AM   Result Value Ref Range    Magnesium 2.0 1.6 - 2.6 mg/dL   Phosphorus    Collection Time: 07/29/23  4:52 AM   Result Value Ref Range    Phosphorus 3.1 2.7 - 4.5 mg/dL   POCT glucose    Collection Time: 07/29/23  7:10 AM   Result Value Ref Range    POCT Glucose 189 (H) 70 - 110 mg/dL   POCT glucose    Collection Time: 07/29/23 11:18 AM   Result Value Ref Range    POCT Glucose 221 (H) 70 - 110 mg/dL   POCT glucose    Collection Time: 07/29/23  4:38 PM   Result Value Ref Range    POCT Glucose 253 (H) 70 - 110 mg/dL   POCT glucose    Collection Time: 07/29/23  9:28 PM   Result Value Ref Range    POCT Glucose 198 (H) 70 - 110 mg/dL   POCT glucose    Collection Time: 07/30/23  2:21 AM   Result Value Ref Range    POCT Glucose 158 (H) 70 - 110 mg/dL   POCT glucose    Collection Time: 07/30/23  7:19 AM   Result Value Ref Range    POCT Glucose 170 (H) 70 - 110 mg/dL   POCT glucose    Collection Time: 07/30/23 11:20 AM   Result Value Ref Range    POCT Glucose 204 (H) 70 - 110 mg/dL   POCT glucose    Collection Time: 07/30/23  4:26 PM   Result Value Ref Range    POCT Glucose 152 (H) 70 - 110 mg/dL   POCT glucose    Collection Time: 07/30/23   8:22 PM   Result Value Ref Range    POCT Glucose 179 (H) 70 - 110 mg/dL   POCT glucose    Collection Time: 07/31/23  7:21 AM   Result Value Ref Range    POCT Glucose 154 (H) 70 - 110 mg/dL   CBC auto differential    Collection Time: 07/31/23  7:47 AM   Result Value Ref Range    WBC 8.42 3.90 - 12.70 K/uL    RBC 4.00 (L) 4.60 - 6.20 M/uL    Hemoglobin 12.5 (L) 14.0 - 18.0 g/dL    Hematocrit 35.9 (L) 40.0 - 54.0 %    MCV 90 82 - 98 fL    MCH 31.3 (H) 27.0 - 31.0 pg    MCHC 34.8 32.0 - 36.0 g/dL    RDW 12.2 11.5 - 14.5 %    Platelets 305 150 - 450 K/uL    MPV 9.7 9.2 - 12.9 fL    Immature Granulocytes CANCELED 0.0 - 0.5 %    Immature Grans (Abs) CANCELED 0.00 - 0.04 K/uL    nRBC 0 0 /100 WBC    Gran % 52.0 38.0 - 73.0 %    Lymph % 33.0 18.0 - 48.0 %    Mono % 5.0 4.0 - 15.0 %    Eosinophil % 4.0 0.0 - 8.0 %    Basophil % 0.0 0.0 - 1.9 %    Bands 6.0 %    Differential Method Manual    Basic metabolic panel    Collection Time: 07/31/23  7:47 AM   Result Value Ref Range    Sodium 134 (L) 136 - 145 mmol/L    Potassium 4.0 3.5 - 5.1 mmol/L    Chloride 101 95 - 110 mmol/L    CO2 21 (L) 23 - 29 mmol/L    Glucose 166 (H) 70 - 110 mg/dL    BUN 11 8 - 23 mg/dL    Creatinine 1.3 0.5 - 1.4 mg/dL    Calcium 10.0 8.7 - 10.5 mg/dL    Anion Gap 12 8 - 16 mmol/L    eGFR 58.0 (A) >60 mL/min/1.73 m^2         Pending Diagnostic Studies:     None         Medications:  Reconciled Home Medications:      Medication List      START taking these medications    apixaban 5 mg Tab  Commonly known as: ELIQUIS  Take 1 tablet (5 mg total) by mouth 2 (two) times daily.     ciprofloxacin HCl 500 MG tablet  Commonly known as: CIPRO  Take 1 tablet (500 mg total) by mouth 2 (two) times daily. for 14 days     metoprolol tartrate 25 MG tablet  Commonly known as: LOPRESSOR  Take 1 tablet (25 mg total) by mouth once. for 1 dose     ondansetron 4 MG tablet  Commonly known as: ZOFRAN  Take 1 tablet (4 mg total) by mouth every 8 (eight) hours as needed for  Nausea.     oxyCODONE 5 MG immediate release tablet  Commonly known as: ROXICODONE  Take 1 tablet (5 mg total) by mouth every 6 (six) hours as needed for Pain.        CHANGE how you take these medications    metoprolol succinate 25 MG 24 hr tablet  Commonly known as: TOPROL-XL  Take 2 tablets (50 mg total) by mouth once daily.  What changed: how much to take        CONTINUE taking these medications    metFORMIN 1000 MG tablet  Commonly known as: GLUCOPHAGE  500 mg.     sildenafiL 100 MG tablet  Commonly known as: VIAGRA  100 mg.            Indwelling Lines/Drains at time of discharge:   Lines/Drains/Airways     None                 Time spent on the discharge of patient: 25 minutes         Gómez Arreguin MD  Department of Hospital Medicine  [facility]

## 2023-08-07 PROBLEM — E11.9 COMPREHENSIVE DIABETIC FOOT EXAMINATION, TYPE 2 DM, ENCOUNTER FOR: Status: ACTIVE | Noted: 2023-08-07

## 2023-10-30 PROBLEM — A41.9 SEVERE SEPSIS: Status: RESOLVED | Noted: 2023-07-26 | Resolved: 2023-10-30

## 2023-10-30 PROBLEM — R65.20 SEVERE SEPSIS: Status: RESOLVED | Noted: 2023-07-26 | Resolved: 2023-10-30

## (undated) DEVICE — SUT 0 36IN COATED VICRYL VI

## (undated) DEVICE — DRILL BIT AND STEINMAN PIN PACKET

## (undated) DEVICE — SPONGE LAP 18X18 PREWASHED

## (undated) DEVICE — DRAPE INCISE IOBAN 2 23X33IN

## (undated) DEVICE — TRAY CATH FOL SIL TEMP 10 16FR

## (undated) DEVICE — GLOVE SURG ULTRA TOUCH 9

## (undated) DEVICE — GAUZE SPONGE 4X4 12PLY

## (undated) DEVICE — SUT MONO 3-0 PS-2 18 PLST

## (undated) DEVICE — GLOVE GAMMEX SURG LF PI SZ 8

## (undated) DEVICE — BOWL MIXING BONE CEMENT

## (undated) DEVICE — TOWEL OR DISP STRL BLUE 4/PK

## (undated) DEVICE — PAD SUREFIT GRND ELECTRD 10FT

## (undated) DEVICE — DRAPE T EXTRM SURG 121X128X90

## (undated) DEVICE — BNDG COFLEX FOAM LF2 ST 4X5YD

## (undated) DEVICE — CLOSURE SKIN STERI STRIP 1/2X4

## (undated) DEVICE — DRAPE THREE-QUARTER 53X77IN

## (undated) DEVICE — CUBE COLD THERAPY POLAR CARE

## (undated) DEVICE — GLOVE SURGEONS ULTRA TOUCH 6.5

## (undated) DEVICE — INTERPULSE SET

## (undated) DEVICE — SPONGE GAUZE 16PLY 4X4

## (undated) DEVICE — COVER TABLE BACK 44IN X 90IN

## (undated) DEVICE — SEE MEDLINE ITEM 157216

## (undated) DEVICE — DRESSING N ADH OIL EMUL 3X3

## (undated) DEVICE — GLOVE SURG ULTRA TOUCH 7

## (undated) DEVICE — BLADE SURG STAINLESS STEEL #10

## (undated) DEVICE — GOWN POLY REINF X-LONG XL

## (undated) DEVICE — BANDAGE MATRIX HK LOOP 6IN 5YD

## (undated) DEVICE — DRAPE STERI INSTRUMENT 1018

## (undated) DEVICE — BNDG COFLEX FOAM LF2 ST 6X5YD

## (undated) DEVICE — GLOVE SURG ULTRA TOUCH 6

## (undated) DEVICE — TOURNIQUET SB QC SP 30X4IN

## (undated) DEVICE — SUT FIBERWIRE 2 38 IN TAPER

## (undated) DEVICE — Device

## (undated) DEVICE — GLOVE SURG ULTRA TOUCH 8.5

## (undated) DEVICE — DRAPE U SPLIT SHEET 54X76IN

## (undated) DEVICE — SOL 9P NACL IRR PIC IL

## (undated) DEVICE — SUT 2-0 VICRYL / CT-1

## (undated) DEVICE — GLOVE SURG ULTRA TOUCH 8

## (undated) DEVICE — TRAY SKIN SCRUB WET PREMIUM

## (undated) DEVICE — DRAPE STERI U-SHAPED 47X51IN

## (undated) DEVICE — ADHESIVE MASTISOL VIAL 48/BX

## (undated) DEVICE — BLADE SAW SGTL DL STR 25X1.27X

## (undated) DEVICE — DRESSING AQUACEL AG 3.5X10IN

## (undated) DEVICE — UNDERGLOVES BIOGEL PI SZ 7 LF

## (undated) DEVICE — SOL IRR NACL .9% 3000ML

## (undated) DEVICE — BANDAGE ESMARK 6X12